# Patient Record
Sex: FEMALE | Race: WHITE | NOT HISPANIC OR LATINO | Employment: FULL TIME | URBAN - METROPOLITAN AREA
[De-identification: names, ages, dates, MRNs, and addresses within clinical notes are randomized per-mention and may not be internally consistent; named-entity substitution may affect disease eponyms.]

---

## 2023-02-20 LAB
BASOPHILS (10*3/UL) IN BLOOD BY AUTOMATED COUNT: NORMAL
BASOPHILS/100 LEUKOCYTES IN BLOOD BY AUTOMATED COUNT: NORMAL
EOSINOPHILS (10*3/UL) IN BLOOD BY AUTOMATED COUNT: NORMAL
EOSINOPHILS/100 LEUKOCYTES IN BLOOD BY AUTOMATED COUNT: NORMAL
ERYTHROCYTE DISTRIBUTION WIDTH (RATIO) BY AUTOMATED COUNT: NORMAL
ERYTHROCYTE MEAN CORPUSCULAR HEMOGLOBIN CONCENTRATION (G/DL) BY AUTOMATED: NORMAL
ERYTHROCYTE MEAN CORPUSCULAR VOLUME (FL) BY AUTOMATED COUNT: NORMAL
ERYTHROCYTES (10*6/UL) IN BLOOD BY AUTOMATED COUNT: NORMAL
FERRITIN (UG/LL) IN SER/PLAS: NORMAL
HEMATOCRIT (%) IN BLOOD BY AUTOMATED COUNT: NORMAL
HEMOGLOBIN (G/DL) IN BLOOD: NORMAL
IMMATURE GRANULOCYTES/100 LEUKOCYTES IN BLOOD BY AUTOMATED COUNT: NORMAL
IRON (UG/DL) IN SER/PLAS: NORMAL
IRON BINDING CAPACITY (UG/DL) IN SER/PLAS: NORMAL
IRON SATURATION (%) IN SER/PLAS: NORMAL
LEUKOCYTES (10*3/UL) IN BLOOD BY AUTOMATED COUNT: NORMAL
LYMPHOCYTES (10*3/UL) IN BLOOD BY AUTOMATED COUNT: NORMAL
LYMPHOCYTES/100 LEUKOCYTES IN BLOOD BY AUTOMATED COUNT: NORMAL
MANUAL DIFFERENTIAL Y/N: NORMAL
MONOCYTES (10*3/UL) IN BLOOD BY AUTOMATED COUNT: NORMAL
MONOCYTES/100 LEUKOCYTES IN BLOOD BY AUTOMATED COUNT: NORMAL
NEUTROPHILS (10*3/UL) IN BLOOD BY AUTOMATED COUNT: NORMAL
NEUTROPHILS/100 LEUKOCYTES IN BLOOD BY AUTOMATED COUNT: NORMAL
NRBC (PER 100 WBCS) BY AUTOMATED COUNT: NORMAL
PLATELETS (10*3/UL) IN BLOOD AUTOMATED COUNT: NORMAL

## 2023-04-05 DIAGNOSIS — I10 HYPERTENSION, UNSPECIFIED TYPE: Primary | ICD-10-CM

## 2023-04-05 RX ORDER — HYDROCHLOROTHIAZIDE 12.5 MG/1
12.5 TABLET ORAL DAILY
Qty: 90 TABLET | Refills: 3 | Status: SHIPPED | OUTPATIENT
Start: 2023-04-05 | End: 2023-08-15 | Stop reason: ALTCHOICE

## 2023-04-05 RX ORDER — HYDROCHLOROTHIAZIDE 12.5 MG/1
12.5 TABLET ORAL DAILY
COMMUNITY
Start: 2022-09-15 | End: 2023-04-05 | Stop reason: SDUPTHER

## 2023-04-05 NOTE — TELEPHONE ENCOUNTER
Lebron from [facility] called they need a refill on her Hydrochlorothiazide 12.5 mg takes it 1 time a day please send to [facility] in [facility]

## 2023-05-10 ENCOUNTER — APPOINTMENT (OUTPATIENT)
Dept: PRIMARY CARE | Facility: CLINIC | Age: 28
End: 2023-05-10
Payer: COMMERCIAL

## 2023-05-10 DIAGNOSIS — N30.00 ACUTE CYSTITIS WITHOUT HEMATURIA: Primary | ICD-10-CM

## 2023-05-19 ENCOUNTER — OFFICE VISIT (OUTPATIENT)
Dept: PRIMARY CARE | Facility: CLINIC | Age: 28
End: 2023-05-19
Payer: COMMERCIAL

## 2023-05-19 ENCOUNTER — TELEPHONE (OUTPATIENT)
Dept: PRIMARY CARE | Facility: CLINIC | Age: 28
End: 2023-05-19

## 2023-05-19 ENCOUNTER — LAB (OUTPATIENT)
Dept: LAB | Facility: LAB | Age: 28
End: 2023-05-19
Payer: COMMERCIAL

## 2023-05-19 VITALS
HEIGHT: 70 IN | HEART RATE: 62 BPM | DIASTOLIC BLOOD PRESSURE: 90 MMHG | RESPIRATION RATE: 18 BRPM | WEIGHT: 209.8 LBS | BODY MASS INDEX: 30.03 KG/M2 | SYSTOLIC BLOOD PRESSURE: 130 MMHG | OXYGEN SATURATION: 92 %

## 2023-05-19 DIAGNOSIS — J18.9 COMMUNITY ACQUIRED PNEUMONIA, UNSPECIFIED LATERALITY: ICD-10-CM

## 2023-05-19 DIAGNOSIS — N30.00 ACUTE CYSTITIS WITHOUT HEMATURIA: Primary | ICD-10-CM

## 2023-05-19 DIAGNOSIS — M79.89 LOCALIZED SWELLING OF BOTH LOWER EXTREMITIES: ICD-10-CM

## 2023-05-19 DIAGNOSIS — N30.00 ACUTE CYSTITIS WITHOUT HEMATURIA: ICD-10-CM

## 2023-05-19 PROBLEM — I10 HYPERTENSION: Status: ACTIVE | Noted: 2021-12-15

## 2023-05-19 PROBLEM — T78.40XA ALLERGIES: Status: ACTIVE | Noted: 2023-05-19

## 2023-05-19 PROBLEM — E55.9 VITAMIN D DEFICIENCY: Status: ACTIVE | Noted: 2023-05-19

## 2023-05-19 PROBLEM — N39.0 UTI (URINARY TRACT INFECTION): Status: ACTIVE | Noted: 2023-05-19

## 2023-05-19 PROBLEM — F17.200 NICOTINE USE DISORDER: Status: ACTIVE | Noted: 2021-05-31

## 2023-05-19 PROBLEM — E53.8 VITAMIN B12 DEFICIENCY: Status: ACTIVE | Noted: 2023-05-19

## 2023-05-19 PROBLEM — E78.5 HYPERLIPIDEMIA: Status: ACTIVE | Noted: 2023-05-19

## 2023-05-19 PROBLEM — R50.9 FEVER: Status: ACTIVE | Noted: 2019-07-30

## 2023-05-19 PROBLEM — F41.9 ANXIETY: Status: ACTIVE | Noted: 2023-05-19

## 2023-05-19 PROBLEM — J45.20 MILD INTERMITTENT ASTHMA WITHOUT COMPLICATION (HHS-HCC): Status: ACTIVE | Noted: 2019-07-30

## 2023-05-19 PROBLEM — F32.A DEPRESSION: Status: ACTIVE | Noted: 2023-05-19

## 2023-05-19 LAB
APPEARANCE, URINE: ABNORMAL
BILIRUBIN, URINE: NEGATIVE
BLOOD, URINE: NEGATIVE
COLOR, URINE: YELLOW
GLUCOSE, URINE: NEGATIVE MG/DL
KETONES, URINE: ABNORMAL MG/DL
LEUKOCYTE ESTERASE, URINE: ABNORMAL
MUCUS, URINE: ABNORMAL /LPF
NITRITE, URINE: NEGATIVE
PH, URINE: 6 (ref 5–8)
PROTEIN, URINE: NEGATIVE MG/DL
RBC, URINE: 1 /HPF (ref 0–5)
SPECIFIC GRAVITY, URINE: 1.01 (ref 1–1.03)
SQUAMOUS EPITHELIAL CELLS, URINE: 1 /HPF
UROBILINOGEN, URINE: 2 MG/DL (ref 0–1.9)
WBC CLUMPS, URINE: ABNORMAL /HPF
WBC, URINE: 49 /HPF (ref 0–5)

## 2023-05-19 PROCEDURE — 81001 URINALYSIS AUTO W/SCOPE: CPT

## 2023-05-19 PROCEDURE — 87086 URINE CULTURE/COLONY COUNT: CPT

## 2023-05-19 PROCEDURE — 99214 OFFICE O/P EST MOD 30 MIN: CPT

## 2023-05-19 PROCEDURE — 3075F SYST BP GE 130 - 139MM HG: CPT

## 2023-05-19 PROCEDURE — 87186 SC STD MICRODIL/AGAR DIL: CPT

## 2023-05-19 PROCEDURE — 3080F DIAST BP >= 90 MM HG: CPT

## 2023-05-19 PROCEDURE — 87077 CULTURE AEROBIC IDENTIFY: CPT

## 2023-05-19 RX ORDER — BUPRENORPHINE HYDROCHLORIDE 8 MG/1
8 TABLET SUBLINGUAL DAILY
COMMUNITY
End: 2023-08-15 | Stop reason: ALTCHOICE

## 2023-05-19 RX ORDER — TORSEMIDE 10 MG/1
10 TABLET ORAL DAILY PRN
Qty: 30 TABLET | Refills: 0 | Status: SHIPPED | OUTPATIENT
Start: 2023-05-19 | End: 2023-06-19

## 2023-05-19 RX ORDER — DOXYCYCLINE 100 MG/1
100 CAPSULE ORAL 2 TIMES DAILY
Qty: 20 CAPSULE | Refills: 0 | Status: SHIPPED | OUTPATIENT
Start: 2023-05-19 | End: 2023-05-19 | Stop reason: SDUPTHER

## 2023-05-19 RX ORDER — FUROSEMIDE 20 MG/1
20 TABLET ORAL DAILY PRN
COMMUNITY
Start: 2023-05-16 | End: 2023-05-24 | Stop reason: ALTCHOICE

## 2023-05-19 RX ORDER — ALBUTEROL SULFATE 90 UG/1
AEROSOL, METERED RESPIRATORY (INHALATION) EVERY 4 HOURS PRN
COMMUNITY
Start: 2021-06-27 | End: 2023-05-24 | Stop reason: SDUPTHER

## 2023-05-19 RX ORDER — DOXYCYCLINE 100 MG/1
100 CAPSULE ORAL 2 TIMES DAILY
Qty: 20 CAPSULE | Refills: 0 | Status: SHIPPED | OUTPATIENT
Start: 2023-05-19 | End: 2023-05-24 | Stop reason: SINTOL

## 2023-05-19 RX ORDER — IBUPROFEN 200 MG
1 TABLET ORAL EVERY 24 HOURS
COMMUNITY
Start: 2023-04-13 | End: 2024-02-20 | Stop reason: ALTCHOICE

## 2023-05-19 RX ORDER — SERTRALINE HYDROCHLORIDE 50 MG/1
50 TABLET, FILM COATED ORAL DAILY
COMMUNITY
Start: 2023-05-16 | End: 2023-08-15 | Stop reason: ALTCHOICE

## 2023-05-19 RX ORDER — TORSEMIDE 10 MG/1
10 TABLET ORAL DAILY PRN
Qty: 30 TABLET | Refills: 0 | Status: SHIPPED | OUTPATIENT
Start: 2023-05-19 | End: 2023-05-19 | Stop reason: SDUPTHER

## 2023-05-19 RX ORDER — AMLODIPINE BESYLATE 10 MG/1
10 TABLET ORAL DAILY
COMMUNITY
Start: 2023-05-16 | End: 2023-08-15 | Stop reason: ALTCHOICE

## 2023-05-19 RX ORDER — CLOBETASOL PROPIONATE 0.5 MG/G
1 CREAM TOPICAL 2 TIMES DAILY PRN
COMMUNITY
Start: 2023-05-02 | End: 2023-05-24 | Stop reason: ALTCHOICE

## 2023-05-19 ASSESSMENT — ENCOUNTER SYMPTOMS
ALLERGIC/IMMUNOLOGIC NEGATIVE: 1
GASTROINTESTINAL NEGATIVE: 1
PSYCHIATRIC NEGATIVE: 1
ENDOCRINE NEGATIVE: 1
CONSTITUTIONAL NEGATIVE: 1
RESPIRATORY NEGATIVE: 1
HEMATOLOGIC/LYMPHATIC NEGATIVE: 1
MUSCULOSKELETAL NEGATIVE: 1
EYES NEGATIVE: 1
NEUROLOGICAL NEGATIVE: 1
CARDIOVASCULAR NEGATIVE: 1

## 2023-05-19 ASSESSMENT — PATIENT HEALTH QUESTIONNAIRE - PHQ9
SUM OF ALL RESPONSES TO PHQ9 QUESTIONS 1 AND 2: 0
2. FEELING DOWN, DEPRESSED OR HOPELESS: NOT AT ALL
1. LITTLE INTEREST OR PLEASURE IN DOING THINGS: NOT AT ALL

## 2023-05-19 NOTE — PATIENT INSTRUCTIONS
Complete UA and culture. Will call with results.    Demadex sent for swollen hands and feet.     Follow up at scheduled future appointment.

## 2023-05-19 NOTE — TELEPHONE ENCOUNTER
She called and she would like her meds to go [facility] in [place] the Torsemide 10 mg & Doxycycline 100 mg

## 2023-05-19 NOTE — PROGRESS NOTES
Left leg swelling/ right hand swelling. She is currently taking furosemide and still having swelling.     Poss uti

## 2023-05-19 NOTE — PROGRESS NOTES
"Subjective   Patient ID: Emili Browne is a 28 y.o. female who presents for Follow-up.    HPI a 28-year-old female with past medical history of asthma, hypertension, anxiety and depression, current cigarette smoker arrives to the clinic with chief complaint of concern for UTI, cough and congestion with yellowish/greenish drainage, and swelling of the arms and legs.  She was given Lasix 20 mg oral tablet as needed for swollen hands and legs.  She reports that every time she has a UTI, she would have swollen hands and feet.  She states that she is having urinary tract infection-like symptoms today.  She reports urinary frequency.  She has been using the Lasix with minimal to no relief.  She also reports having yellowish-green productive cough/drainage.  She continues to smoke.  She is concerned for pneumonia.  She is here for further evaluation helping this.    Review of Systems   Constitutional: Negative.    HENT: Negative.     Eyes: Negative.    Respiratory: Negative.     Cardiovascular: Negative.    Gastrointestinal: Negative.    Endocrine: Negative.    Genitourinary: Negative.    Musculoskeletal: Negative.    Skin: Negative.    Allergic/Immunologic: Negative.    Neurological: Negative.    Hematological: Negative.    Psychiatric/Behavioral: Negative.     All other systems reviewed and are negative.      Objective   /90 (BP Location: Right arm, BP Cuff Size: Large adult)   Pulse 62   Resp 18   Ht 1.778 m (5' 10\")   Wt 95.2 kg (209 lb 12.8 oz)   SpO2 92%   BMI 30.10 kg/m²     Physical Exam  Vitals and nursing note reviewed.   Constitutional:       Appearance: Normal appearance.   HENT:      Head: Normocephalic and atraumatic.      Right Ear: Tympanic membrane normal.      Left Ear: Tympanic membrane normal.      Nose: Nose normal.      Mouth/Throat:      Mouth: Mucous membranes are moist.      Pharynx: Oropharynx is clear.   Eyes:      Extraocular Movements: Extraocular movements intact.      " Conjunctiva/sclera: Conjunctivae normal.      Pupils: Pupils are equal, round, and reactive to light.   Cardiovascular:      Rate and Rhythm: Normal rate and regular rhythm.   Pulmonary:      Effort: Pulmonary effort is normal.      Breath sounds: Normal breath sounds.   Abdominal:      General: Bowel sounds are normal.      Palpations: Abdomen is soft.   Musculoskeletal:         General: Normal range of motion.      Cervical back: Normal range of motion and neck supple.   Skin:     General: Skin is warm.      Capillary Refill: Capillary refill takes less than 2 seconds.   Neurological:      General: No focal deficit present.      Mental Status: She is alert and oriented to person, place, and time. Mental status is at baseline.   Psychiatric:         Mood and Affect: Mood normal.         Behavior: Behavior normal.         Thought Content: Thought content normal.         Judgment: Judgment normal.         Assessment/Plan   Problem List Items Addressed This Visit          Genitourinary    UTI (urinary tract infection) - Primary    Relevant Orders    Urine Culture    Urinalysis with Reflex Microscopic     Other Visit Diagnoses       Localized swelling of both lower extremities        Relevant Medications    torsemide (Demadex) 10 mg tablet          It was a pleasure seeing you in the office today.    Please complete the urinalysis and culture that was ordered today downstairs in lab.  I will call the recommended antibiotics once they are resulted.  I do believe that your swelling of the arms and legs is due to your UTI.  Although this is a weird anomaly, this usually happens for you.  I have sent over the Demadex 10 mg oral tablet as needed for 7 days.  Please use this medication as directed in the office.    I have sent you over doxycycline 100 mg oral tablet 2 times daily for 10 days for prophylactic CAP treatment.    Follow-up at your scheduled PCP appointment.    This document was generated using the assistance of  voice recognition software. If there are any errors of spelling, grammar, syntax, or meaning; please feel free to contact me directly for clarification.

## 2023-05-22 LAB — URINE CULTURE: ABNORMAL

## 2023-05-23 ENCOUNTER — TELEPHONE (OUTPATIENT)
Dept: PRIMARY CARE | Facility: CLINIC | Age: 28
End: 2023-05-23
Payer: COMMERCIAL

## 2023-05-23 DIAGNOSIS — N30.00 ACUTE CYSTITIS WITHOUT HEMATURIA: Primary | ICD-10-CM

## 2023-05-23 RX ORDER — NITROFURANTOIN 25; 75 MG/1; MG/1
100 CAPSULE ORAL 2 TIMES DAILY
Qty: 14 CAPSULE | Refills: 0 | Status: SHIPPED | OUTPATIENT
Start: 2023-05-23 | End: 2023-05-30

## 2023-05-23 NOTE — TELEPHONE ENCOUNTER
Patient states she just found out yesterday that she is pregnant. You have prescribed doxycycline for upper resp infection- should she still take that ? Should she take the Macrobid ? And she mentions she had to change bp meds last time she was pregnant- will she need to do this ? I did advise her to reach out to her gyn. She will do so today but still would like you to answer these questions

## 2023-05-23 NOTE — TELEPHONE ENCOUNTER
Please notify the patient that her urinalysis came back positive for UTI. Macrobid was sent for a week. This was sent to [facility]

## 2023-05-24 ENCOUNTER — OFFICE VISIT (OUTPATIENT)
Dept: PRIMARY CARE | Facility: CLINIC | Age: 28
End: 2023-05-24
Payer: COMMERCIAL

## 2023-05-24 ENCOUNTER — APPOINTMENT (OUTPATIENT)
Dept: PRIMARY CARE | Facility: CLINIC | Age: 28
End: 2023-05-24
Payer: COMMERCIAL

## 2023-05-24 VITALS
DIASTOLIC BLOOD PRESSURE: 80 MMHG | HEART RATE: 80 BPM | RESPIRATION RATE: 18 BRPM | OXYGEN SATURATION: 94 % | SYSTOLIC BLOOD PRESSURE: 132 MMHG | BODY MASS INDEX: 30.03 KG/M2 | WEIGHT: 209.8 LBS | HEIGHT: 70 IN

## 2023-05-24 DIAGNOSIS — N30.00 ACUTE CYSTITIS WITHOUT HEMATURIA: ICD-10-CM

## 2023-05-24 DIAGNOSIS — R11.2 NAUSEA AND VOMITING, UNSPECIFIED VOMITING TYPE: ICD-10-CM

## 2023-05-24 DIAGNOSIS — K21.9 GASTROESOPHAGEAL REFLUX DISEASE WITHOUT ESOPHAGITIS: ICD-10-CM

## 2023-05-24 DIAGNOSIS — J45.20 MILD INTERMITTENT ASTHMA WITHOUT COMPLICATION (HHS-HCC): Primary | ICD-10-CM

## 2023-05-24 DIAGNOSIS — Z3A.01 LESS THAN 8 WEEKS GESTATION OF PREGNANCY (HHS-HCC): ICD-10-CM

## 2023-05-24 DIAGNOSIS — I10 PRIMARY HYPERTENSION: ICD-10-CM

## 2023-05-24 PROBLEM — J18.9 COMMUNITY ACQUIRED PNEUMONIA: Status: RESOLVED | Noted: 2023-05-19 | Resolved: 2023-05-24

## 2023-05-24 PROBLEM — M79.89 LOCALIZED SWELLING OF BOTH LOWER EXTREMITIES: Status: RESOLVED | Noted: 2023-05-19 | Resolved: 2023-05-24

## 2023-05-24 LAB — PREGNANCY TEST URINE, POC: POSITIVE

## 2023-05-24 PROCEDURE — 3075F SYST BP GE 130 - 139MM HG: CPT

## 2023-05-24 PROCEDURE — 81025 URINE PREGNANCY TEST: CPT

## 2023-05-24 PROCEDURE — 99213 OFFICE O/P EST LOW 20 MIN: CPT

## 2023-05-24 PROCEDURE — 3079F DIAST BP 80-89 MM HG: CPT

## 2023-05-24 RX ORDER — MECLIZINE HYDROCHLORIDE 25 MG/1
25 TABLET ORAL 3 TIMES DAILY PRN
Qty: 30 TABLET | Refills: 11 | Status: SHIPPED | OUTPATIENT
Start: 2023-05-24 | End: 2023-08-15 | Stop reason: ALTCHOICE

## 2023-05-24 RX ORDER — ONDANSETRON HYDROCHLORIDE 8 MG/1
8 TABLET, FILM COATED ORAL EVERY 12 HOURS PRN
Qty: 20 TABLET | Refills: 0 | Status: SHIPPED | OUTPATIENT
Start: 2023-05-24 | End: 2023-08-15 | Stop reason: ALTCHOICE

## 2023-05-24 RX ORDER — FAMOTIDINE 20 MG/1
20 TABLET, FILM COATED ORAL 2 TIMES DAILY
Qty: 60 TABLET | Refills: 5 | Status: SHIPPED | OUTPATIENT
Start: 2023-05-24 | End: 2023-08-15 | Stop reason: ALTCHOICE

## 2023-05-24 RX ORDER — ALBUTEROL SULFATE 90 UG/1
2 AEROSOL, METERED RESPIRATORY (INHALATION) EVERY 4 HOURS PRN
Qty: 18 G | Refills: 0 | Status: SHIPPED | OUTPATIENT
Start: 2023-05-24 | End: 2024-02-20 | Stop reason: SDUPTHER

## 2023-05-24 ASSESSMENT — ENCOUNTER SYMPTOMS
DEPRESSION: 0
CARDIOVASCULAR NEGATIVE: 1
OCCASIONAL FEELINGS OF UNSTEADINESS: 0
GASTROINTESTINAL NEGATIVE: 1
MUSCULOSKELETAL NEGATIVE: 1
EYES NEGATIVE: 1
RESPIRATORY NEGATIVE: 1
PSYCHIATRIC NEGATIVE: 1
CONSTITUTIONAL NEGATIVE: 1
HEMATOLOGIC/LYMPHATIC NEGATIVE: 1
ALLERGIC/IMMUNOLOGIC NEGATIVE: 1
LOSS OF SENSATION IN FEET: 0
NEUROLOGICAL NEGATIVE: 1
ENDOCRINE NEGATIVE: 1

## 2023-05-24 ASSESSMENT — PAIN SCALES - GENERAL: PAINLEVEL: 0-NO PAIN

## 2023-05-24 ASSESSMENT — PATIENT HEALTH QUESTIONNAIRE - PHQ9
2. FEELING DOWN, DEPRESSED OR HOPELESS: NOT AT ALL
1. LITTLE INTEREST OR PLEASURE IN DOING THINGS: NOT AT ALL
SUM OF ALL RESPONSES TO PHQ9 QUESTIONS 1 AND 2: 0

## 2023-05-24 ASSESSMENT — ANXIETY QUESTIONNAIRES
7. FEELING AFRAID AS IF SOMETHING AWFUL MIGHT HAPPEN: SEVERAL DAYS
1. FEELING NERVOUS, ANXIOUS, OR ON EDGE: MORE THAN HALF THE DAYS
4. TROUBLE RELAXING: NOT AT ALL
3. WORRYING TOO MUCH ABOUT DIFFERENT THINGS: NOT AT ALL
5. BEING SO RESTLESS THAT IT IS HARD TO SIT STILL: NOT AT ALL
6. BECOMING EASILY ANNOYED OR IRRITABLE: NOT AT ALL
2. NOT BEING ABLE TO STOP OR CONTROL WORRYING: NOT AT ALL
GAD7 TOTAL SCORE: 3

## 2023-05-24 NOTE — PROGRESS NOTES
"Subjective   Patient ID: Emili Browne is a 28 y.o. female who presents for medication review (pregnant).    HPI a 28-year-old female with past medical history of recurrent UTIs, current cigarette smoker, anxiety and depression, currently on Suboxone for drug withdrawal, hypertension arrives to clinic with chief complaint of pregnancy.  Patient reports that she found out that she was pregnant yesterday.  She was seen in this office by myself 2 days ago.  She was prescribed doxycycline for concerns for an upper respiratory infection and Macrobid for a UTI.  Once she found out that she was pregnant, she called the office.  I told her to make an appointment for today.  She is here to discuss what medications she is safe to take.  She does report increased nausea and vomiting ever since she found out she was pregnant.  She is here for further evaluation and health maintenance.    Review of Systems   Constitutional: Negative.    HENT: Negative.     Eyes: Negative.    Respiratory: Negative.     Cardiovascular: Negative.    Gastrointestinal: Negative.    Endocrine: Negative.    Genitourinary: Negative.    Musculoskeletal: Negative.    Skin: Negative.    Allergic/Immunologic: Negative.    Neurological: Negative.    Hematological: Negative.    Psychiatric/Behavioral: Negative.     All other systems reviewed and are negative.      Objective   /80 (BP Location: Left arm, Patient Position: Sitting, BP Cuff Size: Adult)   Pulse 80   Resp 18   Ht 1.778 m (5' 10\")   Wt 95.2 kg (209 lb 12.8 oz)   SpO2 94%   BMI 30.10 kg/m²     Physical Exam  Vitals and nursing note reviewed.   Constitutional:       Appearance: Normal appearance.   HENT:      Head: Normocephalic and atraumatic.      Right Ear: Tympanic membrane normal.      Left Ear: Tympanic membrane normal.      Nose: Nose normal.      Mouth/Throat:      Mouth: Mucous membranes are moist.      Pharynx: Oropharynx is clear.   Eyes:      Extraocular Movements: " Extraocular movements intact.      Conjunctiva/sclera: Conjunctivae normal.      Pupils: Pupils are equal, round, and reactive to light.   Cardiovascular:      Rate and Rhythm: Normal rate and regular rhythm.   Pulmonary:      Effort: Pulmonary effort is normal.      Breath sounds: Normal breath sounds.   Abdominal:      General: Bowel sounds are normal.      Palpations: Abdomen is soft.   Musculoskeletal:         General: Normal range of motion.      Cervical back: Normal range of motion and neck supple.   Skin:     General: Skin is warm.      Capillary Refill: Capillary refill takes less than 2 seconds.   Neurological:      General: No focal deficit present.      Mental Status: She is alert and oriented to person, place, and time. Mental status is at baseline.   Psychiatric:         Mood and Affect: Mood normal.         Behavior: Behavior normal.         Thought Content: Thought content normal.         Judgment: Judgment normal.         Assessment/Plan   Problem List Items Addressed This Visit          Respiratory    Mild intermittent asthma without complication - Primary    Relevant Medications    albuterol 90 mcg/actuation inhaler     Other Visit Diagnoses       Gastroesophageal reflux disease without esophagitis        Relevant Medications    famotidine (Pepcid) 20 mg tablet    Nausea and vomiting, unspecified vomiting type        Relevant Medications    meclizine (Antivert) 25 mg tablet    ondansetron (Zofran) 8 mg tablet    Less than 8 weeks gestation of pregnancy        Relevant Orders    POCT pregnancy, urine manually resulted          It was a pleasure seeing you in the office today.    Please do not take your doxycycline anymore.  This is contraindicated in pregnancies.    I did speak with pharmacy in regards to your UTI, being pregnant, and your current allergies.  You are able to continue to take the Macrobid and completed as a whole.    In regards to your nausea and vomiting, I have sent over Zofran 8  mg oral tablet every 2 hours as needed, meclizine 25 mg oral tablet 3 times a day as needed, and Pepcid 20 mg oral tablet twice a day for acid reflux.  Please follow-up with OB/GYN for further evaluation and treatment.    In regards to your hypertension, you are okay to take amlodipine 10 mg oral tablet daily and hydrochlorothiazide 12.5 mg oral tablet daily.    We have discontinued your clobetasol cream and doxycycline today.    Regards to your shortness of breath and asthma, I have sent over an albuterol inhaler.  Per resources that was searched in the office today, I am unable to prescribe any other inhalers until you are cleared with OB/GYN.    In regards to your cigarette smoking, please use nicotine patches until you follow-up with your OB/GYN.    In regards to your Suboxone regimen, please discuss this medication with your OB/GYN and your Suboxone provider as they may have alternate treatments for this.    We did a point-of-care urine pregnancy test and it was positive for pregnancy.    Follow-up with your OB/GYN as scheduled.  Follow-up with your PCP appointment as scheduled.    Please do not hesitate to call office for any future questions or concerns.  Have a good rest of the summer and take care.

## 2023-06-17 DIAGNOSIS — M79.89 LOCALIZED SWELLING OF BOTH LOWER EXTREMITIES: ICD-10-CM

## 2023-06-19 RX ORDER — TORSEMIDE 10 MG/1
10 TABLET ORAL DAILY PRN
Qty: 30 TABLET | Refills: 0 | Status: SHIPPED | OUTPATIENT
Start: 2023-06-19 | End: 2023-08-15

## 2023-07-26 ENCOUNTER — TELEPHONE (OUTPATIENT)
Dept: PRIMARY CARE | Facility: CLINIC | Age: 28
End: 2023-07-26
Payer: COMMERCIAL

## 2023-07-26 NOTE — TELEPHONE ENCOUNTER
She called to see if you would write a letter for her support dog cause she has anxiety & depression for where she lives. Please call her at  [phone] and she can pick it up

## 2023-08-15 ENCOUNTER — OFFICE VISIT (OUTPATIENT)
Dept: PRIMARY CARE | Facility: CLINIC | Age: 28
End: 2023-08-15
Payer: COMMERCIAL

## 2023-08-15 VITALS
WEIGHT: 204.6 LBS | HEART RATE: 72 BPM | HEIGHT: 70 IN | DIASTOLIC BLOOD PRESSURE: 74 MMHG | SYSTOLIC BLOOD PRESSURE: 118 MMHG | BODY MASS INDEX: 29.29 KG/M2 | RESPIRATION RATE: 16 BRPM | OXYGEN SATURATION: 97 %

## 2023-08-15 DIAGNOSIS — Z3A.16 16 WEEKS GESTATION OF PREGNANCY (HHS-HCC): Primary | ICD-10-CM

## 2023-08-15 DIAGNOSIS — I10 PRIMARY HYPERTENSION: ICD-10-CM

## 2023-08-15 PROCEDURE — 3074F SYST BP LT 130 MM HG: CPT

## 2023-08-15 PROCEDURE — 99213 OFFICE O/P EST LOW 20 MIN: CPT

## 2023-08-15 PROCEDURE — 3078F DIAST BP <80 MM HG: CPT

## 2023-08-15 RX ORDER — LABETALOL 200 MG/1
200 TABLET, FILM COATED ORAL 2 TIMES DAILY
COMMUNITY
Start: 2023-06-09 | End: 2024-02-20 | Stop reason: ALTCHOICE

## 2023-08-15 RX ORDER — METRONIDAZOLE 500 MG/1
500 TABLET ORAL
COMMUNITY
Start: 2023-06-09 | End: 2023-08-15 | Stop reason: ALTCHOICE

## 2023-08-15 RX ORDER — BUPRENORPHINE 2 MG/1
10 TABLET SUBLINGUAL DAILY
COMMUNITY

## 2023-08-15 RX ORDER — MICONAZOLE NITRATE 2 %
2 CREAM (GRAM) TOPICAL AS NEEDED
COMMUNITY
Start: 2023-06-08 | End: 2024-02-20 | Stop reason: ALTCHOICE

## 2023-08-15 RX ORDER — VITAMIN A ACETATE, .BETA.-CAROTENE, ASCORBIC ACID, CHOLECALCIFEROL, .ALPHA.-TOCOPHEROL ACETATE, DL-, THIAMINE MONONITRATE, RIBOFLAVIN, NIACINAMIDE, PYRIDOXINE HYDROCHLORIDE, FOLIC ACID, CYANOCOBALAMIN, CALCIUM CARBONATE, FERROUS FUMARATE, ZINC OXIDE, CUPRIC OXIDE 9.9; 120; 920; 200; 400; 2; 12; 27; 1; 20; 10; 3; 1.84; 3080; 25 MG/1; MG/1; [IU]/1; MG/1; [IU]/1; MG/1; UG/1; MG/1; MG/1; MG/1; MG/1; MG/1; MG/1; [IU]/1; MG/1
1 TABLET, FILM COATED ORAL DAILY
COMMUNITY
Start: 2023-06-08 | End: 2024-02-20 | Stop reason: ALTCHOICE

## 2023-08-15 RX ORDER — PYRIDOXINE HCL (VITAMIN B6) 25 MG
25 TABLET ORAL DAILY
COMMUNITY
Start: 2023-06-08 | End: 2024-02-20 | Stop reason: ALTCHOICE

## 2023-08-15 ASSESSMENT — ENCOUNTER SYMPTOMS
LOSS OF SENSATION IN FEET: 0
DEPRESSION: 0
HEMATOLOGIC/LYMPHATIC NEGATIVE: 1
GASTROINTESTINAL NEGATIVE: 1
CONSTITUTIONAL NEGATIVE: 1
RESPIRATORY NEGATIVE: 1
NEUROLOGICAL NEGATIVE: 1
CARDIOVASCULAR NEGATIVE: 1
MUSCULOSKELETAL NEGATIVE: 1
EYES NEGATIVE: 1
ALLERGIC/IMMUNOLOGIC NEGATIVE: 1
ENDOCRINE NEGATIVE: 1
PSYCHIATRIC NEGATIVE: 1
OCCASIONAL FEELINGS OF UNSTEADINESS: 0

## 2023-08-15 ASSESSMENT — ANXIETY QUESTIONNAIRES
5. BEING SO RESTLESS THAT IT IS HARD TO SIT STILL: NOT AT ALL
GAD7 TOTAL SCORE: 0
IF YOU CHECKED OFF ANY PROBLEMS ON THIS QUESTIONNAIRE, HOW DIFFICULT HAVE THESE PROBLEMS MADE IT FOR YOU TO DO YOUR WORK, TAKE CARE OF THINGS AT HOME, OR GET ALONG WITH OTHER PEOPLE: NOT DIFFICULT AT ALL
4. TROUBLE RELAXING: NOT AT ALL
6. BECOMING EASILY ANNOYED OR IRRITABLE: NOT AT ALL
2. NOT BEING ABLE TO STOP OR CONTROL WORRYING: NOT AT ALL
1. FEELING NERVOUS, ANXIOUS, OR ON EDGE: NOT AT ALL
7. FEELING AFRAID AS IF SOMETHING AWFUL MIGHT HAPPEN: NOT AT ALL
3. WORRYING TOO MUCH ABOUT DIFFERENT THINGS: NOT AT ALL

## 2023-08-15 NOTE — PROGRESS NOTES
"Subjective   Patient ID: Emili Browne is a 28 y.o. female who presents for Follow-up.    HPI a 28-year-old female who is currently 16 weeks pregnant G2 T1  L1, history of opiate abuse currently on Subutex 10 mg oral tablet daily, hypertension arrives to the clinic with chief complaint of 6-month follow-up.  Patient reports that she follows [facility].  She notes that she is having a daughter.  She reports no recent illnesses and has been relatively healthy.  When she did meet up with OB/GYN, they discontinued the majority of her hypertension medications and started her on labetalol 200 mg oral tablet twice a day.  I did recommend that she follows up with cardiology given her history of opioid abuse and hypertension.  She denies any chest pain or shortness of breath.  She is here just to give us updates.  She is otherwise healthy with no additional complaints.    Review of Systems   Constitutional: Negative.    HENT: Negative.     Eyes: Negative.    Respiratory: Negative.     Cardiovascular: Negative.    Gastrointestinal: Negative.    Endocrine: Negative.    Genitourinary: Negative.    Musculoskeletal: Negative.    Skin: Negative.    Allergic/Immunologic: Negative.    Neurological: Negative.    Hematological: Negative.    Psychiatric/Behavioral: Negative.     All other systems reviewed and are negative.      Objective   /74   Pulse 72   Resp 16   Ht 1.778 m (5' 10\")   Wt 92.8 kg (204 lb 9.6 oz)   LMP 2023 (Exact Date)   SpO2 97%   BMI 29.36 kg/m²     Physical Exam  Vitals and nursing note reviewed.   Constitutional:       Appearance: Normal appearance.   HENT:      Head: Normocephalic and atraumatic.      Right Ear: Tympanic membrane normal.      Left Ear: Tympanic membrane normal.      Nose: Nose normal.      Mouth/Throat:      Mouth: Mucous membranes are moist.      Pharynx: Oropharynx is clear.   Eyes:      Extraocular Movements: Extraocular movements intact.      " Conjunctiva/sclera: Conjunctivae normal.      Pupils: Pupils are equal, round, and reactive to light.   Cardiovascular:      Rate and Rhythm: Normal rate and regular rhythm.   Pulmonary:      Effort: Pulmonary effort is normal.      Breath sounds: Normal breath sounds.   Abdominal:      General: Bowel sounds are normal.      Palpations: Abdomen is soft.   Musculoskeletal:         General: Normal range of motion.      Cervical back: Normal range of motion and neck supple.   Skin:     General: Skin is warm.      Capillary Refill: Capillary refill takes less than 2 seconds.   Neurological:      General: No focal deficit present.      Mental Status: She is alert and oriented to person, place, and time. Mental status is at baseline.   Psychiatric:         Mood and Affect: Mood normal.         Behavior: Behavior normal.         Thought Content: Thought content normal.         Judgment: Judgment normal.         Assessment/Plan   Problem List Items Addressed This Visit       Hypertension    Relevant Orders    Follow Up In [facility] - PCP - Established    16 weeks gestation of pregnancy - Primary    Relevant Orders    Comprehensive Metabolic Panel    CBC    Follow Up In [facility] - PCP - Established     It was a pleasure seeing you in the office.    Continue labetalol 200 mg oral tablet twice a day for your blood pressure.  Your blood pressure is controlled.    Continue healthy eating, diet, and exercise.  Continue the treatment plan set forth by your OB/GYN.  I have ordered blood work for you to complete prior to your next appointment.  These labs require you to fast.  You report that your daughter is due January 2024.  Next appointment will be February of next year.  You will be following up with Toshia OVALLES.    Please do not hesitate to call the office for any future questions or concerns.    Once again, congratulations.    I also advised you to follow low fat diet and exercise for at least 30  minutes daily.    Anticipatory guidance, age appropriate vaccines, screening exams, health promotion and prevention discussed.    This document was generated using the assistance of voice recognition software. If there are any errors of spelling, grammar, syntax, or meaning; please feel free to contact me directly for clarification.

## 2023-08-15 NOTE — PATIENT INSTRUCTIONS
Please continue follow up with OBGYN. You are 4 months pregnant. Congratulations!!    6 month follow up with Toshia Fischer CNP.    Blood work to complete before your next appointment.

## 2024-02-20 ENCOUNTER — OFFICE VISIT (OUTPATIENT)
Dept: PRIMARY CARE | Facility: CLINIC | Age: 29
End: 2024-02-20
Payer: COMMERCIAL

## 2024-02-20 VITALS
HEART RATE: 64 BPM | BODY MASS INDEX: 27.06 KG/M2 | WEIGHT: 189 LBS | DIASTOLIC BLOOD PRESSURE: 60 MMHG | HEIGHT: 70 IN | SYSTOLIC BLOOD PRESSURE: 90 MMHG

## 2024-02-20 DIAGNOSIS — E78.2 MIXED HYPERLIPIDEMIA: ICD-10-CM

## 2024-02-20 DIAGNOSIS — I10 PRIMARY HYPERTENSION: ICD-10-CM

## 2024-02-20 DIAGNOSIS — Z76.89 ENCOUNTER TO ESTABLISH CARE: ICD-10-CM

## 2024-02-20 DIAGNOSIS — J45.20 MILD INTERMITTENT ASTHMA WITHOUT COMPLICATION (HHS-HCC): ICD-10-CM

## 2024-02-20 DIAGNOSIS — E55.9 VITAMIN D DEFICIENCY: Primary | ICD-10-CM

## 2024-02-20 DIAGNOSIS — R35.0 URINE FREQUENCY: ICD-10-CM

## 2024-02-20 DIAGNOSIS — F19.11 HISTORY OF DRUG ABUSE (MULTI): ICD-10-CM

## 2024-02-20 DIAGNOSIS — R73.03 PREDIABETES: ICD-10-CM

## 2024-02-20 DIAGNOSIS — E53.8 VITAMIN B12 DEFICIENCY: ICD-10-CM

## 2024-02-20 DIAGNOSIS — F17.200 NICOTINE USE DISORDER: ICD-10-CM

## 2024-02-20 PROBLEM — R50.9 FEVER: Status: RESOLVED | Noted: 2019-07-30 | Resolved: 2024-02-20

## 2024-02-20 PROBLEM — Z3A.16 16 WEEKS GESTATION OF PREGNANCY (HHS-HCC): Status: RESOLVED | Noted: 2023-08-15 | Resolved: 2024-02-20

## 2024-02-20 PROBLEM — Z3A.01 LESS THAN 8 WEEKS GESTATION OF PREGNANCY (HHS-HCC): Status: RESOLVED | Noted: 2023-05-24 | Resolved: 2024-02-20

## 2024-02-20 PROBLEM — N39.0 UTI (URINARY TRACT INFECTION): Status: RESOLVED | Noted: 2023-05-19 | Resolved: 2024-02-20

## 2024-02-20 LAB
POC APPEARANCE, URINE: CLEAR
POC BILIRUBIN, URINE: NEGATIVE
POC BLOOD, URINE: NEGATIVE
POC COLOR, URINE: ABNORMAL
POC GLUCOSE, URINE: NEGATIVE MG/DL
POC KETONES, URINE: NEGATIVE MG/DL
POC LEUKOCYTES, URINE: NEGATIVE
POC NITRITE,URINE: NEGATIVE
POC PH, URINE: 7 PH
POC PROTEIN, URINE: ABNORMAL MG/DL
POC SPECIFIC GRAVITY, URINE: 1.01
POC UROBILINOGEN, URINE: 0.2 EU/DL

## 2024-02-20 PROCEDURE — 87086 URINE CULTURE/COLONY COUNT: CPT

## 2024-02-20 PROCEDURE — 3078F DIAST BP <80 MM HG: CPT | Performed by: CLINICAL NURSE SPECIALIST

## 2024-02-20 PROCEDURE — 81002 URINALYSIS NONAUTO W/O SCOPE: CPT | Performed by: CLINICAL NURSE SPECIALIST

## 2024-02-20 PROCEDURE — 4004F PT TOBACCO SCREEN RCVD TLK: CPT | Performed by: CLINICAL NURSE SPECIALIST

## 2024-02-20 PROCEDURE — 3074F SYST BP LT 130 MM HG: CPT | Performed by: CLINICAL NURSE SPECIALIST

## 2024-02-20 PROCEDURE — 80307 DRUG TEST PRSMV CHEM ANLYZR: CPT

## 2024-02-20 PROCEDURE — 99214 OFFICE O/P EST MOD 30 MIN: CPT | Performed by: CLINICAL NURSE SPECIALIST

## 2024-02-20 PROCEDURE — 80354 DRUG SCREENING FENTANYL: CPT

## 2024-02-20 RX ORDER — ALBUTEROL SULFATE 90 UG/1
2 AEROSOL, METERED RESPIRATORY (INHALATION) EVERY 4 HOURS PRN
Qty: 18 G | Refills: 3 | Status: SHIPPED | OUTPATIENT
Start: 2024-02-20

## 2024-02-20 ASSESSMENT — ENCOUNTER SYMPTOMS
CONFUSION: 0
ARTHRALGIAS: 0
CHEST TIGHTNESS: 0
WHEEZING: 0
FEVER: 0
NAUSEA: 0
COUGH: 0
WOUND: 0
BACK PAIN: 0
DIZZINESS: 0
JOINT SWELLING: 0
CHILLS: 0
SLEEP DISTURBANCE: 0
TROUBLE SWALLOWING: 0
LOSS OF SENSATION IN FEET: 0
SEIZURES: 0
UNEXPECTED WEIGHT CHANGE: 0
ACTIVITY CHANGE: 0
CONSTIPATION: 0
HEMATURIA: 0
DEPRESSION: 0
OCCASIONAL FEELINGS OF UNSTEADINESS: 0
SORE THROAT: 0
NECK PAIN: 0
PHOTOPHOBIA: 0
ABDOMINAL PAIN: 0
PALPITATIONS: 0
DIARRHEA: 0
BLOOD IN STOOL: 0
HEADACHES: 0
DYSURIA: 0
POLYDIPSIA: 0
BRUISES/BLEEDS EASILY: 0
VOMITING: 0
FLANK PAIN: 0
EYE PAIN: 0
FATIGUE: 0
MYALGIAS: 0
APPETITE CHANGE: 0
SHORTNESS OF BREATH: 0

## 2024-02-20 ASSESSMENT — COLUMBIA-SUICIDE SEVERITY RATING SCALE - C-SSRS
2. HAVE YOU ACTUALLY HAD ANY THOUGHTS OF KILLING YOURSELF?: NO
6. HAVE YOU EVER DONE ANYTHING, STARTED TO DO ANYTHING, OR PREPARED TO DO ANYTHING TO END YOUR LIFE?: NO
1. IN THE PAST MONTH, HAVE YOU WISHED YOU WERE DEAD OR WISHED YOU COULD GO TO SLEEP AND NOT WAKE UP?: NO

## 2024-02-20 NOTE — PROGRESS NOTES
"Subjective   Patient ID: Emili Browne is a 28 y.o. female who presents for Follow-up (Transfer from /Follow up, also would like to discuss not enough urine out put on going years ).  HPI    New patient here today to establish care.  Transfer care from .     Patient is post partum 1 month. C Section. Not currently breastfeeding. Denies any issues with Delivery/Pregnancy.     Patient is currently on Subtex. History of Heroin abuse. Rehab and [facility]. Has been on since December 2022. Following meetings. IOP after care. In the process of getting oldest daughter back. Following with Counseling and Probation. Requesting drug screening.     Torsemide PRN, using for Peripheral edema.     Urination, worse after her Pregnancy. States that she was \"peeing a lot while Pregnant.\" States that she has to Urinate, trouble peeing now.     Review of Systems   Constitutional:  Negative for activity change, appetite change, chills, fatigue, fever and unexpected weight change.   HENT:  Negative for ear pain, hearing loss, nosebleeds, sore throat, tinnitus and trouble swallowing.    Eyes:  Negative for photophobia, pain and visual disturbance.   Respiratory:  Negative for cough, chest tightness, shortness of breath and wheezing.    Cardiovascular:  Negative for chest pain, palpitations and leg swelling.   Gastrointestinal:  Negative for abdominal pain, blood in stool, constipation, diarrhea, nausea and vomiting.   Endocrine: Negative for cold intolerance, heat intolerance, polydipsia and polyuria.   Genitourinary:  Negative for dysuria, flank pain and hematuria.   Musculoskeletal:  Negative for arthralgias, back pain, joint swelling, myalgias and neck pain.   Skin:  Negative for pallor, rash and wound.   Allergic/Immunologic: Negative for immunocompromised state.   Neurological:  Negative for dizziness, seizures and headaches.   Hematological:  Does not bruise/bleed easily.   Psychiatric/Behavioral:  Negative for confusion and " sleep disturbance.        Objective   Physical Exam  Vitals and nursing note reviewed.   Constitutional:       General: She is not in acute distress.     Appearance: Normal appearance.   HENT:      Head: Normocephalic.      Nose: Nose normal.   Eyes:      Conjunctiva/sclera: Conjunctivae normal.   Neck:      Vascular: No carotid bruit.   Cardiovascular:      Rate and Rhythm: Normal rate and regular rhythm.      Pulses: Normal pulses.      Heart sounds: Normal heart sounds.   Pulmonary:      Effort: Pulmonary effort is normal.      Breath sounds: Normal breath sounds.   Abdominal:      General: Bowel sounds are normal.      Palpations: Abdomen is soft.   Musculoskeletal:         General: Normal range of motion.      Cervical back: Normal range of motion.   Skin:     General: Skin is warm and dry.   Neurological:      Mental Status: She is alert and oriented to person, place, and time. Mental status is at baseline.   Psychiatric:         Mood and Affect: Mood normal.         Behavior: Behavior normal.         Assessment/Plan       Hypertension: Blood pressure controlled at OV today. Continue to monitor.   Asthma: Albuterol PRN. Respiratory status at baseline. Continue to monitor.   Vitamin D Deficiency: Reevaluate with next lab work.   Vitamin B12 Deficiency: Reevaluate with next lab work.   Hyperlipidemia: Updated lab work ordered. Will follow up pending results.   Prediabetes: A1C 5.7%. Lifestyle changes recommended: Diet consisting of low fat foods, lean meats, high fiber, fresh fruits and vegetables. 150 min/ weekly aerobic exercise. Will check with next lab work.   Abnormal Urine: Studies ordered. Will follow up pending results.   History of Drug Abuse: Screening ordered as requested.   Nicotine Use: Counseled on long term negative health impacts on health if tobacco use continued.  Reviewed options for cessation aid including patches, lozenges, Wellbutrin.       Tdap: November 2023.   Unable to update  immunizations due to Insurance.     Toshia Fischer, APRN-CNS 02/20/24 1:22 PM

## 2024-02-21 LAB
AMPHETAMINES UR QL SCN: ABNORMAL
BARBITURATES UR QL SCN: ABNORMAL
BENZODIAZ UR QL SCN: ABNORMAL
BZE UR QL SCN: ABNORMAL
CANNABINOIDS UR QL SCN: ABNORMAL
FENTANYL+NORFENTANYL UR QL SCN: ABNORMAL
OPIATES UR QL SCN: ABNORMAL
OXYCODONE+OXYMORPHONE UR QL SCN: ABNORMAL
PCP UR QL SCN: ABNORMAL

## 2024-02-22 ENCOUNTER — TELEPHONE (OUTPATIENT)
Dept: PRIMARY CARE | Facility: CLINIC | Age: 29
End: 2024-02-22
Payer: COMMERCIAL

## 2024-02-22 DIAGNOSIS — N39.0 URINARY TRACT INFECTION WITHOUT HEMATURIA, SITE UNSPECIFIED: Primary | ICD-10-CM

## 2024-02-22 LAB — BACTERIA UR CULT: ABNORMAL

## 2024-02-22 RX ORDER — SULFAMETHOXAZOLE AND TRIMETHOPRIM 800; 160 MG/1; MG/1
1 TABLET ORAL 2 TIMES DAILY
Qty: 6 TABLET | Refills: 0 | Status: SHIPPED | OUTPATIENT
Start: 2024-02-22 | End: 2024-02-25

## 2024-02-22 NOTE — TELEPHONE ENCOUNTER
----- Message from ARELI Deleon-CNS sent at 2/22/2024  1:00 PM EST -----  Urine showed Group B Strep, medication sent to the Pharmacy for treatment. Thank you!

## 2024-02-25 LAB
FENTANYL UR CFM-MCNC: <2.5 NG/ML
NORFENTANYL UR CFM-MCNC: <2.5 NG/ML

## 2024-03-08 ENCOUNTER — APPOINTMENT (OUTPATIENT)
Dept: RADIOLOGY | Facility: HOSPITAL | Age: 29
End: 2024-03-08
Payer: COMMERCIAL

## 2024-03-08 ENCOUNTER — HOSPITAL ENCOUNTER (EMERGENCY)
Facility: HOSPITAL | Age: 29
Discharge: HOME | End: 2024-03-09
Attending: EMERGENCY MEDICINE
Payer: COMMERCIAL

## 2024-03-08 VITALS
OXYGEN SATURATION: 96 % | SYSTOLIC BLOOD PRESSURE: 126 MMHG | WEIGHT: 190 LBS | DIASTOLIC BLOOD PRESSURE: 79 MMHG | RESPIRATION RATE: 16 BRPM | TEMPERATURE: 98.1 F | HEART RATE: 71 BPM | BODY MASS INDEX: 28.14 KG/M2 | HEIGHT: 69 IN

## 2024-03-08 DIAGNOSIS — S16.1XXA CERVICAL STRAIN, ACUTE, INITIAL ENCOUNTER: Primary | ICD-10-CM

## 2024-03-08 DIAGNOSIS — J45.21 EXACERBATION OF INTERMITTENT ASTHMA, UNSPECIFIED ASTHMA SEVERITY (HHS-HCC): ICD-10-CM

## 2024-03-08 LAB
AMPHETAMINES UR QL SCN: NORMAL
BARBITURATES UR QL SCN: NORMAL
BENZODIAZ UR QL SCN: NORMAL
BZE UR QL SCN: NORMAL
CANNABINOIDS UR QL SCN: NORMAL
FENTANYL+NORFENTANYL UR QL SCN: NORMAL
HCG UR QL IA.RAPID: NEGATIVE
METHADONE UR QL SCN: NORMAL
OPIATES UR QL SCN: NORMAL
OXYCODONE+OXYMORPHONE UR QL SCN: NORMAL
PCP UR QL SCN: NORMAL

## 2024-03-08 PROCEDURE — 2500000002 HC RX 250 W HCPCS SELF ADMINISTERED DRUGS (ALT 637 FOR MEDICARE OP, ALT 636 FOR OP/ED): Performed by: EMERGENCY MEDICINE

## 2024-03-08 PROCEDURE — 70450 CT HEAD/BRAIN W/O DYE: CPT | Performed by: RADIOLOGY

## 2024-03-08 PROCEDURE — 2500000004 HC RX 250 GENERAL PHARMACY W/ HCPCS (ALT 636 FOR OP/ED): Performed by: EMERGENCY MEDICINE

## 2024-03-08 PROCEDURE — 73130 X-RAY EXAM OF HAND: CPT | Mod: RT

## 2024-03-08 PROCEDURE — 99285 EMERGENCY DEPT VISIT HI MDM: CPT | Mod: 25

## 2024-03-08 PROCEDURE — 80307 DRUG TEST PRSMV CHEM ANLYZR: CPT | Performed by: EMERGENCY MEDICINE

## 2024-03-08 PROCEDURE — 70450 CT HEAD/BRAIN W/O DYE: CPT

## 2024-03-08 PROCEDURE — 81025 URINE PREGNANCY TEST: CPT | Performed by: EMERGENCY MEDICINE

## 2024-03-08 PROCEDURE — 2500000001 HC RX 250 WO HCPCS SELF ADMINISTERED DRUGS (ALT 637 FOR MEDICARE OP): Performed by: EMERGENCY MEDICINE

## 2024-03-08 PROCEDURE — 73130 X-RAY EXAM OF HAND: CPT | Mod: RIGHT SIDE | Performed by: RADIOLOGY

## 2024-03-08 PROCEDURE — 94640 AIRWAY INHALATION TREATMENT: CPT

## 2024-03-08 PROCEDURE — 72125 CT NECK SPINE W/O DYE: CPT

## 2024-03-08 PROCEDURE — 72125 CT NECK SPINE W/O DYE: CPT | Performed by: RADIOLOGY

## 2024-03-08 RX ORDER — IBUPROFEN 200 MG
600 TABLET ORAL ONCE
Status: COMPLETED | OUTPATIENT
Start: 2024-03-08 | End: 2024-03-08

## 2024-03-08 RX ORDER — IBUPROFEN 600 MG/1
TABLET ORAL
Status: DISCONTINUED
Start: 2024-03-08 | End: 2024-03-09 | Stop reason: HOSPADM

## 2024-03-08 RX ORDER — IPRATROPIUM BROMIDE AND ALBUTEROL SULFATE 2.5; .5 MG/3ML; MG/3ML
3 SOLUTION RESPIRATORY (INHALATION) ONCE
Status: COMPLETED | OUTPATIENT
Start: 2024-03-08 | End: 2024-03-08

## 2024-03-08 RX ORDER — PREDNISONE 10 MG/1
50 TABLET ORAL ONCE
Status: COMPLETED | OUTPATIENT
Start: 2024-03-08 | End: 2024-03-08

## 2024-03-08 RX ORDER — IPRATROPIUM BROMIDE AND ALBUTEROL SULFATE 2.5; .5 MG/3ML; MG/3ML
SOLUTION RESPIRATORY (INHALATION)
Status: DISCONTINUED
Start: 2024-03-08 | End: 2024-03-09 | Stop reason: HOSPADM

## 2024-03-08 RX ORDER — PREDNISONE 10 MG/1
TABLET ORAL
Status: DISCONTINUED
Start: 2024-03-08 | End: 2024-03-09 | Stop reason: HOSPADM

## 2024-03-08 RX ADMIN — IBUPROFEN 600 MG: 600 TABLET, FILM COATED ORAL at 23:18

## 2024-03-08 RX ADMIN — IPRATROPIUM BROMIDE AND ALBUTEROL SULFATE 3 ML: .5; 3 SOLUTION RESPIRATORY (INHALATION) at 23:17

## 2024-03-08 RX ADMIN — PREDNISONE 50 MG: 10 TABLET ORAL at 23:18

## 2024-03-08 ASSESSMENT — COLUMBIA-SUICIDE SEVERITY RATING SCALE - C-SSRS
2. HAVE YOU ACTUALLY HAD ANY THOUGHTS OF KILLING YOURSELF?: NO
1. IN THE PAST MONTH, HAVE YOU WISHED YOU WERE DEAD OR WISHED YOU COULD GO TO SLEEP AND NOT WAKE UP?: NO
6. HAVE YOU EVER DONE ANYTHING, STARTED TO DO ANYTHING, OR PREPARED TO DO ANYTHING TO END YOUR LIFE?: NO

## 2024-03-08 ASSESSMENT — PAIN - FUNCTIONAL ASSESSMENT: PAIN_FUNCTIONAL_ASSESSMENT: 0-10

## 2024-03-08 ASSESSMENT — PAIN SCALES - GENERAL: PAINLEVEL_OUTOF10: 5 - MODERATE PAIN

## 2024-03-09 RX ORDER — CYCLOBENZAPRINE HCL 10 MG
10 TABLET ORAL 2 TIMES DAILY PRN
Qty: 14 TABLET | Refills: 0 | Status: SHIPPED | OUTPATIENT
Start: 2024-03-09 | End: 2024-03-16

## 2024-03-09 RX ORDER — PREDNISONE 50 MG/1
50 TABLET ORAL DAILY
Qty: 5 TABLET | Refills: 0 | Status: SHIPPED | OUTPATIENT
Start: 2024-03-09 | End: 2024-03-14
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
## 2024-03-11 ENCOUNTER — TELEPHONE (OUTPATIENT)
Dept: PRIMARY CARE | Facility: CLINIC | Age: 29
End: 2024-03-11
Payer: COMMERCIAL
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
## 2024-08-20 ENCOUNTER — APPOINTMENT (OUTPATIENT)
Dept: PRIMARY CARE | Facility: CLINIC | Age: 29
End: 2024-08-20
Payer: COMMERCIAL

## 2024-08-20 VITALS
HEART RATE: 72 BPM | BODY MASS INDEX: 28.88 KG/M2 | HEIGHT: 69 IN | DIASTOLIC BLOOD PRESSURE: 82 MMHG | SYSTOLIC BLOOD PRESSURE: 130 MMHG | WEIGHT: 195 LBS

## 2024-08-20 DIAGNOSIS — R82.90 ABNORMAL URINE ODOR: ICD-10-CM

## 2024-08-20 DIAGNOSIS — R35.0 URINE FREQUENCY: ICD-10-CM

## 2024-08-20 DIAGNOSIS — J45.20 MILD INTERMITTENT ASTHMA WITHOUT COMPLICATION (HHS-HCC): ICD-10-CM

## 2024-08-20 DIAGNOSIS — E53.8 VITAMIN B12 DEFICIENCY: ICD-10-CM

## 2024-08-20 DIAGNOSIS — E78.2 MIXED HYPERLIPIDEMIA: ICD-10-CM

## 2024-08-20 DIAGNOSIS — R73.03 PREDIABETES: ICD-10-CM

## 2024-08-20 DIAGNOSIS — E55.9 VITAMIN D DEFICIENCY: ICD-10-CM

## 2024-08-20 DIAGNOSIS — B19.20 HEPATITIS C VIRUS INFECTION WITHOUT HEPATIC COMA, UNSPECIFIED CHRONICITY: Primary | ICD-10-CM

## 2024-08-20 DIAGNOSIS — I10 PRIMARY HYPERTENSION: ICD-10-CM

## 2024-08-20 LAB
POC APPEARANCE, URINE: ABNORMAL
POC BILIRUBIN, URINE: NEGATIVE
POC BLOOD, URINE: NEGATIVE
POC COLOR, URINE: YELLOW
POC GLUCOSE, URINE: NEGATIVE MG/DL
POC KETONES, URINE: NEGATIVE MG/DL
POC LEUKOCYTES, URINE: ABNORMAL
POC NITRITE,URINE: POSITIVE
POC PH, URINE: 6.5 PH
POC PROTEIN, URINE: NEGATIVE MG/DL
POC SPECIFIC GRAVITY, URINE: 1.02
POC UROBILINOGEN, URINE: 1 EU/DL

## 2024-08-20 PROCEDURE — 3008F BODY MASS INDEX DOCD: CPT | Performed by: CLINICAL NURSE SPECIALIST

## 2024-08-20 PROCEDURE — 81002 URINALYSIS NONAUTO W/O SCOPE: CPT | Performed by: CLINICAL NURSE SPECIALIST

## 2024-08-20 PROCEDURE — 99214 OFFICE O/P EST MOD 30 MIN: CPT | Performed by: CLINICAL NURSE SPECIALIST

## 2024-08-20 PROCEDURE — 3079F DIAST BP 80-89 MM HG: CPT | Performed by: CLINICAL NURSE SPECIALIST

## 2024-08-20 PROCEDURE — 3075F SYST BP GE 130 - 139MM HG: CPT | Performed by: CLINICAL NURSE SPECIALIST

## 2024-08-20 PROCEDURE — 87186 SC STD MICRODIL/AGAR DIL: CPT

## 2024-08-20 PROCEDURE — 4004F PT TOBACCO SCREEN RCVD TLK: CPT | Performed by: CLINICAL NURSE SPECIALIST

## 2024-08-20 PROCEDURE — 99406 BEHAV CHNG SMOKING 3-10 MIN: CPT | Performed by: CLINICAL NURSE SPECIALIST

## 2024-08-20 PROCEDURE — 87086 URINE CULTURE/COLONY COUNT: CPT

## 2024-08-20 RX ORDER — CIPROFLOXACIN 500 MG/1
500 TABLET ORAL 2 TIMES DAILY
Qty: 14 TABLET | Refills: 0 | Status: SHIPPED | OUTPATIENT
Start: 2024-08-20 | End: 2024-08-27

## 2024-08-20 RX ORDER — ALBUTEROL SULFATE 90 UG/1
2 INHALANT RESPIRATORY (INHALATION) EVERY 4 HOURS PRN
Qty: 18 G | Refills: 11 | Status: SHIPPED | OUTPATIENT
Start: 2024-08-20

## 2024-08-20 ASSESSMENT — ENCOUNTER SYMPTOMS
DIARRHEA: 0
NAUSEA: 0
JOINT SWELLING: 0
CONSTIPATION: 0
SORE THROAT: 0
UNEXPECTED WEIGHT CHANGE: 0
MYALGIAS: 0
OCCASIONAL FEELINGS OF UNSTEADINESS: 0
APPETITE CHANGE: 0
COUGH: 1
TROUBLE SWALLOWING: 0
FATIGUE: 0
BACK PAIN: 0
CHILLS: 0
DYSURIA: 0
HEMATURIA: 0
WOUND: 0
SHORTNESS OF BREATH: 0
BRUISES/BLEEDS EASILY: 0
FEVER: 0
HEADACHES: 0
DIZZINESS: 0
LOSS OF SENSATION IN FEET: 0
VOMITING: 0
DEPRESSION: 0
SLEEP DISTURBANCE: 0
CONFUSION: 0
ARTHRALGIAS: 0
SEIZURES: 0
EYE PAIN: 0
PHOTOPHOBIA: 0
FLANK PAIN: 0
PALPITATIONS: 0
ACTIVITY CHANGE: 0
CHEST TIGHTNESS: 0
WHEEZING: 0
ABDOMINAL PAIN: 0
NECK PAIN: 0
BLOOD IN STOOL: 0
POLYDIPSIA: 0

## 2024-08-20 ASSESSMENT — PATIENT HEALTH QUESTIONNAIRE - PHQ9
1. LITTLE INTEREST OR PLEASURE IN DOING THINGS: NOT AT ALL
2. FEELING DOWN, DEPRESSED OR HOPELESS: NOT AT ALL
SUM OF ALL RESPONSES TO PHQ9 QUESTIONS 1 AND 2: 0

## 2024-08-20 ASSESSMENT — COLUMBIA-SUICIDE SEVERITY RATING SCALE - C-SSRS
6. HAVE YOU EVER DONE ANYTHING, STARTED TO DO ANYTHING, OR PREPARED TO DO ANYTHING TO END YOUR LIFE?: NO
2. HAVE YOU ACTUALLY HAD ANY THOUGHTS OF KILLING YOURSELF?: NO
1. IN THE PAST MONTH, HAVE YOU WISHED YOU WERE DEAD OR WISHED YOU COULD GO TO SLEEP AND NOT WAKE UP?: NO

## 2024-08-20 NOTE — PROGRESS NOTES
Subjective   Patient ID: Emili Browne is a 29 y.o. female who presents for Follow-up (Follow up/Discuss coughing up phlegm X's 2 months also urine smells strong 1 week).  HPI    Here today as a follow up appointment.     Has been coughing up phlegm for the past few months. Discolored phlegm. Using her Albuterol PRN.     Urine has been strong odor for the past week.     Patient is currently on Subtex. History of Heroin abuse. Rehab and Sober Living. Has been on since December 2022. Following meetings. IOP after care. Following with Counseling and Probation.      Torsemide PRN, using for Peripheral edema.     Review of Systems   Constitutional:  Negative for activity change, appetite change, chills, fatigue, fever and unexpected weight change.   HENT:  Negative for ear pain, hearing loss, nosebleeds, sore throat, tinnitus and trouble swallowing.    Eyes:  Negative for photophobia, pain and visual disturbance.   Respiratory:  Positive for cough. Negative for chest tightness, shortness of breath and wheezing.    Cardiovascular:  Negative for chest pain, palpitations and leg swelling.   Gastrointestinal:  Negative for abdominal pain, blood in stool, constipation, diarrhea, nausea and vomiting.   Endocrine: Negative for cold intolerance, heat intolerance, polydipsia and polyuria.   Genitourinary:  Negative for dysuria, flank pain and hematuria.   Musculoskeletal:  Negative for arthralgias, back pain, joint swelling, myalgias and neck pain.   Skin:  Negative for pallor, rash and wound.   Allergic/Immunologic: Negative for immunocompromised state.   Neurological:  Negative for dizziness, seizures and headaches.   Hematological:  Does not bruise/bleed easily.   Psychiatric/Behavioral:  Negative for confusion and sleep disturbance.        Objective   Physical Exam  Vitals and nursing note reviewed.   Constitutional:       General: She is not in acute distress.     Appearance: Normal appearance.   HENT:      Head:  Normocephalic.      Nose: Nose normal.   Eyes:      Conjunctiva/sclera: Conjunctivae normal.   Neck:      Vascular: No carotid bruit.   Cardiovascular:      Rate and Rhythm: Normal rate and regular rhythm.      Pulses: Normal pulses.      Heart sounds: Normal heart sounds.   Pulmonary:      Effort: Pulmonary effort is normal.      Breath sounds: Normal breath sounds.   Abdominal:      General: Bowel sounds are normal.      Palpations: Abdomen is soft.   Musculoskeletal:         General: Normal range of motion.      Cervical back: Normal range of motion.   Skin:     General: Skin is warm and dry.   Neurological:      Mental Status: She is alert and oriented to person, place, and time. Mental status is at baseline.   Psychiatric:         Mood and Affect: Mood normal.         Behavior: Behavior normal.       Assessment/Plan       Due for updated lab work. Has ordered.      Hypertension: Blood pressure controlled at OV today. Continue to monitor.   Asthma: Albuterol PRN. Continue to monitor.   Vitamin D Deficiency: Reevaluate with next lab work.   Vitamin B12 Deficiency: Reevaluate with next lab work.   Hyperlipidemia: Updated lab work ordered. Will follow up pending results.   Prediabetes: A1C 5.7%. Lifestyle changes recommended: Diet consisting of low fat foods, lean meats, high fiber, fresh fruits and vegetables. 150 min/ weekly aerobic exercise. Will check with next lab work.   Abnormal Urine: Urine dip done in the office. Will start Cipro. Will follow up pending results of Culture.    Nicotine Use: Counseled on long term negative health impacts on health if tobacco use continued.  Reviewed options for cessation aid including patches, lozenges, Wellbutrin.  3 minutes were spent counseling the patient on tobacco cessation.  Benefits of cessation were discussed as well as techniques to help quit.    Hepatitis C: GI referral.       Tdap: November 2023.   Unable to update immunizations due to Insurance.     Toshia ORONA  ARELI Fischer-CNS 08/20/24 1:45 PM

## 2024-08-23 ENCOUNTER — LAB (OUTPATIENT)
Dept: LAB | Facility: LAB | Age: 29
End: 2024-08-23
Payer: COMMERCIAL

## 2024-08-23 DIAGNOSIS — J45.20 MILD INTERMITTENT ASTHMA, UNCOMPLICATED (HHS-HCC): ICD-10-CM

## 2024-08-23 DIAGNOSIS — E78.2 MIXED HYPERLIPIDEMIA: ICD-10-CM

## 2024-08-23 DIAGNOSIS — E55.9 VITAMIN D DEFICIENCY: ICD-10-CM

## 2024-08-23 DIAGNOSIS — R73.03 PREDIABETES: ICD-10-CM

## 2024-08-23 DIAGNOSIS — E55.9 VITAMIN D DEFICIENCY, UNSPECIFIED: ICD-10-CM

## 2024-08-23 DIAGNOSIS — I10 PRIMARY HYPERTENSION: ICD-10-CM

## 2024-08-23 DIAGNOSIS — J45.20 MILD INTERMITTENT ASTHMA WITHOUT COMPLICATION (HHS-HCC): ICD-10-CM

## 2024-08-23 DIAGNOSIS — E53.8 VITAMIN B12 DEFICIENCY: ICD-10-CM

## 2024-08-23 DIAGNOSIS — I10 ESSENTIAL (PRIMARY) HYPERTENSION: Primary | ICD-10-CM

## 2024-08-23 DIAGNOSIS — E53.8 DEFICIENCY OF OTHER SPECIFIED B GROUP VITAMINS: ICD-10-CM

## 2024-08-23 LAB — BACTERIA UR CULT: ABNORMAL

## 2024-09-06 ENCOUNTER — LAB (OUTPATIENT)
Dept: LAB | Facility: LAB | Age: 29
End: 2024-09-06
Payer: COMMERCIAL

## 2024-09-06 DIAGNOSIS — R73.03 PREDIABETES: ICD-10-CM

## 2024-09-06 DIAGNOSIS — I10 ESSENTIAL (PRIMARY) HYPERTENSION: ICD-10-CM

## 2024-09-06 DIAGNOSIS — J45.20 MILD INTERMITTENT ASTHMA, UNCOMPLICATED (HHS-HCC): ICD-10-CM

## 2024-09-06 DIAGNOSIS — E78.2 MIXED HYPERLIPIDEMIA: ICD-10-CM

## 2024-09-06 DIAGNOSIS — E55.9 VITAMIN D DEFICIENCY, UNSPECIFIED: ICD-10-CM

## 2024-09-06 DIAGNOSIS — E53.8 DEFICIENCY OF OTHER SPECIFIED B GROUP VITAMINS: ICD-10-CM
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
## 2024-10-23 ENCOUNTER — OFFICE VISIT (OUTPATIENT)
Dept: GASTROENTEROLOGY | Facility: HOSPITAL | Age: 29
End: 2024-10-23
Payer: COMMERCIAL

## 2024-10-23 VITALS
OXYGEN SATURATION: 91 % | SYSTOLIC BLOOD PRESSURE: 135 MMHG | WEIGHT: 201 LBS | TEMPERATURE: 98.2 F | HEIGHT: 69 IN | HEART RATE: 64 BPM | DIASTOLIC BLOOD PRESSURE: 82 MMHG | BODY MASS INDEX: 29.77 KG/M2

## 2024-10-23 DIAGNOSIS — B19.20 HEPATITIS C VIRUS INFECTION WITHOUT HEPATIC COMA, UNSPECIFIED CHRONICITY: ICD-10-CM

## 2024-10-23 PROCEDURE — 4004F PT TOBACCO SCREEN RCVD TLK: CPT | Performed by: STUDENT IN AN ORGANIZED HEALTH CARE EDUCATION/TRAINING PROGRAM

## 2024-10-23 PROCEDURE — 3008F BODY MASS INDEX DOCD: CPT | Performed by: STUDENT IN AN ORGANIZED HEALTH CARE EDUCATION/TRAINING PROGRAM

## 2024-10-23 PROCEDURE — 99203 OFFICE O/P NEW LOW 30 MIN: CPT | Performed by: STUDENT IN AN ORGANIZED HEALTH CARE EDUCATION/TRAINING PROGRAM

## 2024-10-23 PROCEDURE — 99213 OFFICE O/P EST LOW 20 MIN: CPT | Mod: GC | Performed by: STUDENT IN AN ORGANIZED HEALTH CARE EDUCATION/TRAINING PROGRAM

## 2024-10-23 PROCEDURE — 3079F DIAST BP 80-89 MM HG: CPT | Performed by: STUDENT IN AN ORGANIZED HEALTH CARE EDUCATION/TRAINING PROGRAM

## 2024-10-23 PROCEDURE — 3075F SYST BP GE 130 - 139MM HG: CPT | Performed by: STUDENT IN AN ORGANIZED HEALTH CARE EDUCATION/TRAINING PROGRAM

## 2024-10-23 ASSESSMENT — PAIN SCALES - GENERAL: PAINLEVEL_OUTOF10: 0-NO PAIN

## 2024-10-23 NOTE — LETTER
October 23, 2024     Patient: Emili Browne   YOB: 1995   Date of Visit: 10/23/2024       To Whom It May Concern:    Emili Browne was seen in my clinic on 10/23/2024 at 2:00 pm. Please excuse Emili for her absence from work on this day to make the appointment.    If you have any questions or concerns, please don't hesitate to call.         Sincerely,         Chey Campa MD        CC: No Recipients

## 2024-10-23 NOTE — PROGRESS NOTES
I saw and evaluated the patient. I personally obtained the key and critical portions of the history and physical exam or was physically present for key and critical portions performed by the resident/fellow. I reviewed the resident/fellow's documentation and discussed the patient with the resident/fellow. I agree with the resident/fellow's medical decision making as documented in the note with the exception/addition of the following:  HCV antibody positive  HCV  PCR persistently  negative.   Will ask her to repeat HCV PCR .   No treatment necessary.   Would get fibroscan and if low degree of fibrosis (F2 or less) then no follow up with hepatology should be necessary.     Vlad Hahn MD

## 2024-10-23 NOTE — PATIENT INSTRUCTIONS
Thank you for seeing us in hepatology clinic today. Based on your labs, your viral level is negative which means you dont have hepatitis C. You will likely always the hepatitic C antibody. We will recheck the level with your next set of labs to be entirely sure. We will order you for a fibroscan of your liver to assess if your liver has hardening/scarring from hepatitis c. If you have some scarring of your liver we will keep seeing you in clinic every 6 months.

## 2024-11-15 ENCOUNTER — APPOINTMENT (OUTPATIENT)
Dept: GASTROENTEROLOGY | Facility: CLINIC | Age: 29
End: 2024-11-15
Payer: COMMERCIAL

## 2024-11-26 ENCOUNTER — CLINICAL SUPPORT (OUTPATIENT)
Dept: GASTROENTEROLOGY | Facility: CLINIC | Age: 29
End: 2024-11-26
Payer: COMMERCIAL

## 2024-11-26 ENCOUNTER — LAB (OUTPATIENT)
Dept: LAB | Facility: LAB | Age: 29
End: 2024-11-26
Payer: COMMERCIAL

## 2024-11-26 DIAGNOSIS — I10 ESSENTIAL (PRIMARY) HYPERTENSION: ICD-10-CM

## 2024-11-26 DIAGNOSIS — E78.2 MIXED HYPERLIPIDEMIA: ICD-10-CM

## 2024-11-26 DIAGNOSIS — B19.20 HEPATITIS C VIRUS INFECTION WITHOUT HEPATIC COMA, UNSPECIFIED CHRONICITY: ICD-10-CM

## 2024-11-26 DIAGNOSIS — R73.03 PREDIABETES: ICD-10-CM

## 2024-11-26 DIAGNOSIS — E55.9 VITAMIN D DEFICIENCY, UNSPECIFIED: ICD-10-CM

## 2024-11-26 DIAGNOSIS — J45.20 MILD INTERMITTENT ASTHMA, UNCOMPLICATED (HHS-HCC): ICD-10-CM

## 2024-11-26 DIAGNOSIS — E53.8 DEFICIENCY OF OTHER SPECIFIED B GROUP VITAMINS: ICD-10-CM

## 2024-11-26 LAB
25(OH)D3 SERPL-MCNC: 40 NG/ML (ref 30–100)
ALBUMIN SERPL BCP-MCNC: 4.6 G/DL (ref 3.4–5)
ALP SERPL-CCNC: 44 U/L (ref 33–110)
ALT SERPL W P-5'-P-CCNC: 22 U/L (ref 7–45)
ANION GAP SERPL CALC-SCNC: 13 MMOL/L (ref 10–20)
AST SERPL W P-5'-P-CCNC: 23 U/L (ref 9–39)
BASOPHILS # BLD AUTO: 0.04 X10*3/UL (ref 0–0.1)
BASOPHILS NFR BLD AUTO: 0.8 %
BILIRUB SERPL-MCNC: 0.4 MG/DL (ref 0–1.2)
BUN SERPL-MCNC: 12 MG/DL (ref 6–23)
CALCIUM SERPL-MCNC: 9.7 MG/DL (ref 8.6–10.6)
CHLORIDE SERPL-SCNC: 104 MMOL/L (ref 98–107)
CHOLEST SERPL-MCNC: 147 MG/DL (ref 0–199)
CHOLESTEROL/HDL RATIO: 3.3
CO2 SERPL-SCNC: 26 MMOL/L (ref 21–32)
CREAT SERPL-MCNC: 0.67 MG/DL (ref 0.5–1.05)
EGFRCR SERPLBLD CKD-EPI 2021: >90 ML/MIN/1.73M*2
EOSINOPHIL # BLD AUTO: 0.23 X10*3/UL (ref 0–0.7)
EOSINOPHIL NFR BLD AUTO: 4.6 %
ERYTHROCYTE [DISTWIDTH] IN BLOOD BY AUTOMATED COUNT: 12 % (ref 11.5–14.5)
EST. AVERAGE GLUCOSE BLD GHB EST-MCNC: 108 MG/DL
GLUCOSE SERPL-MCNC: 98 MG/DL (ref 74–99)
HAV IGM SER QL: NONREACTIVE
HBA1C MFR BLD: 5.4 %
HBV CORE IGM SER QL: NONREACTIVE
HBV SURFACE AG SERPL QL IA: NONREACTIVE
HCT VFR BLD AUTO: 41.5 % (ref 36–46)
HCV AB SER QL: REACTIVE
HDLC SERPL-MCNC: 45.1 MG/DL
HGB BLD-MCNC: 13.4 G/DL (ref 12–16)
HIV 1+2 AB+HIV1 P24 AG SERPL QL IA: NONREACTIVE
IMM GRANULOCYTES # BLD AUTO: 0.01 X10*3/UL (ref 0–0.7)
IMM GRANULOCYTES NFR BLD AUTO: 0.2 % (ref 0–0.9)
LDLC SERPL CALC-MCNC: 85 MG/DL
LYMPHOCYTES # BLD AUTO: 1.99 X10*3/UL (ref 1.2–4.8)
LYMPHOCYTES NFR BLD AUTO: 40 %
MCH RBC QN AUTO: 28.3 PG (ref 26–34)
MCHC RBC AUTO-ENTMCNC: 32.3 G/DL (ref 32–36)
MCV RBC AUTO: 88 FL (ref 80–100)
MONOCYTES # BLD AUTO: 0.37 X10*3/UL (ref 0.1–1)
MONOCYTES NFR BLD AUTO: 7.4 %
NEUTROPHILS # BLD AUTO: 2.33 X10*3/UL (ref 1.2–7.7)
NEUTROPHILS NFR BLD AUTO: 47 %
NON HDL CHOLESTEROL: 102 MG/DL (ref 0–149)
NRBC BLD-RTO: 0 /100 WBCS (ref 0–0)
PLATELET # BLD AUTO: 188 X10*3/UL (ref 150–450)
POTASSIUM SERPL-SCNC: 4.2 MMOL/L (ref 3.5–5.3)
PROT SERPL-MCNC: 7.8 G/DL (ref 6.4–8.2)
RBC # BLD AUTO: 4.74 X10*6/UL (ref 4–5.2)
SODIUM SERPL-SCNC: 139 MMOL/L (ref 136–145)
TREPONEMA PALLIDUM IGG+IGM AB [PRESENCE] IN SERUM OR PLASMA BY IMMUNOASSAY: NONREACTIVE
TRIGL SERPL-MCNC: 85 MG/DL (ref 0–149)
TSH SERPL-ACNC: 1.33 MIU/L (ref 0.44–3.98)
VIT B12 SERPL-MCNC: 381 PG/ML (ref 211–911)
VLDL: 17 MG/DL (ref 0–40)
WBC # BLD AUTO: 5 X10*3/UL (ref 4.4–11.3)

## 2024-11-26 PROCEDURE — 84443 ASSAY THYROID STIM HORMONE: CPT

## 2024-11-26 PROCEDURE — 82607 VITAMIN B-12: CPT

## 2024-11-26 PROCEDURE — 87389 HIV-1 AG W/HIV-1&-2 AB AG IA: CPT

## 2024-11-26 PROCEDURE — 85025 COMPLETE CBC W/AUTO DIFF WBC: CPT

## 2024-11-26 PROCEDURE — 36415 COLL VENOUS BLD VENIPUNCTURE: CPT

## 2024-11-26 PROCEDURE — 80053 COMPREHEN METABOLIC PANEL: CPT

## 2024-11-26 PROCEDURE — 86780 TREPONEMA PALLIDUM: CPT

## 2024-11-26 PROCEDURE — 80074 ACUTE HEPATITIS PANEL: CPT

## 2024-11-26 PROCEDURE — 80061 LIPID PANEL: CPT

## 2024-11-26 PROCEDURE — 83036 HEMOGLOBIN GLYCOSYLATED A1C: CPT

## 2024-11-26 PROCEDURE — 82306 VITAMIN D 25 HYDROXY: CPT

## 2024-11-26 PROCEDURE — 87521 HEPATITIS C PROBE&RVRS TRNSC: CPT

## 2024-11-27 LAB
HCV RNA SERPL NAA+PROBE-ACNC: NOT DETECTED K[IU]/ML
HCV RNA SERPL NAA+PROBE-LOG IU: NORMAL {LOG_IU}/ML
LABORATORY COMMENT REPORT: NORMAL

## 2025-02-20 ENCOUNTER — APPOINTMENT (OUTPATIENT)
Dept: PRIMARY CARE | Facility: CLINIC | Age: 30
End: 2025-02-20
Payer: COMMERCIAL

## 2025-02-20 VITALS
HEIGHT: 69 IN | BODY MASS INDEX: 30.96 KG/M2 | DIASTOLIC BLOOD PRESSURE: 80 MMHG | HEART RATE: 60 BPM | SYSTOLIC BLOOD PRESSURE: 110 MMHG | WEIGHT: 209 LBS

## 2025-02-20 DIAGNOSIS — J32.9 RECURRENT SINUSITIS: Primary | ICD-10-CM

## 2025-02-20 DIAGNOSIS — E55.9 VITAMIN D DEFICIENCY: ICD-10-CM

## 2025-02-20 DIAGNOSIS — E53.8 VITAMIN B12 DEFICIENCY: ICD-10-CM

## 2025-02-20 DIAGNOSIS — Z00.00 HEALTHCARE MAINTENANCE: ICD-10-CM

## 2025-02-20 DIAGNOSIS — E78.2 MIXED HYPERLIPIDEMIA: ICD-10-CM

## 2025-02-20 DIAGNOSIS — I10 PRIMARY HYPERTENSION: ICD-10-CM

## 2025-02-20 DIAGNOSIS — R73.03 PREDIABETES: ICD-10-CM

## 2025-02-20 DIAGNOSIS — B19.20 HEPATITIS C VIRUS INFECTION WITHOUT HEPATIC COMA, UNSPECIFIED CHRONICITY: ICD-10-CM

## 2025-02-20 DIAGNOSIS — J45.20 MILD INTERMITTENT ASTHMA WITHOUT COMPLICATION (HHS-HCC): ICD-10-CM

## 2025-02-20 PROCEDURE — 99406 BEHAV CHNG SMOKING 3-10 MIN: CPT | Performed by: CLINICAL NURSE SPECIALIST

## 2025-02-20 PROCEDURE — 99395 PREV VISIT EST AGE 18-39: CPT | Performed by: CLINICAL NURSE SPECIALIST

## 2025-02-20 PROCEDURE — 99213 OFFICE O/P EST LOW 20 MIN: CPT | Performed by: CLINICAL NURSE SPECIALIST

## 2025-02-20 PROCEDURE — 3074F SYST BP LT 130 MM HG: CPT | Performed by: CLINICAL NURSE SPECIALIST

## 2025-02-20 PROCEDURE — 3008F BODY MASS INDEX DOCD: CPT | Performed by: CLINICAL NURSE SPECIALIST

## 2025-02-20 PROCEDURE — 3079F DIAST BP 80-89 MM HG: CPT | Performed by: CLINICAL NURSE SPECIALIST

## 2025-02-20 ASSESSMENT — ENCOUNTER SYMPTOMS
ACTIVITY CHANGE: 0
EYE PAIN: 0
HEADACHES: 0
LOSS OF SENSATION IN FEET: 0
WHEEZING: 0
SLEEP DISTURBANCE: 0
APPETITE CHANGE: 0
ABDOMINAL PAIN: 0
NECK PAIN: 0
FEVER: 0
WOUND: 0
BRUISES/BLEEDS EASILY: 0
SHORTNESS OF BREATH: 0
ARTHRALGIAS: 0
OCCASIONAL FEELINGS OF UNSTEADINESS: 0
PALPITATIONS: 0
DEPRESSION: 0
PHOTOPHOBIA: 0
JOINT SWELLING: 0
COUGH: 0
UNEXPECTED WEIGHT CHANGE: 0
NAUSEA: 0
DIZZINESS: 0
DIARRHEA: 0
SORE THROAT: 0
POLYDIPSIA: 0
VOMITING: 0
BLOOD IN STOOL: 0
BACK PAIN: 0
CHILLS: 0
MYALGIAS: 0
FATIGUE: 0
CONFUSION: 0
HEMATURIA: 0
CHEST TIGHTNESS: 0
CONSTIPATION: 0
TROUBLE SWALLOWING: 0
SEIZURES: 0
FLANK PAIN: 0
DYSURIA: 0

## 2025-02-20 NOTE — LETTER
February 20, 2025     Patient: Emili Browne   YOB: 1995   Date of Visit: 2/20/2025       To Whom It May Concern:    Emili Browne was seen in my clinic on 2/20/2025 at 2:00 pm. Please excuse Emili for her absence from work on this day to make the appointment.    If you have any questions or concerns, please don't hesitate to call.         Sincerely,         Toshia Fischer, ARELI-CNS
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
## 2025-03-03 ENCOUNTER — APPOINTMENT (OUTPATIENT)
Dept: OTOLARYNGOLOGY | Facility: CLINIC | Age: 30
End: 2025-03-03
Payer: COMMERCIAL

## 2025-03-05 ENCOUNTER — APPOINTMENT (OUTPATIENT)
Dept: OTOLARYNGOLOGY | Facility: CLINIC | Age: 30
End: 2025-03-05
Payer: COMMERCIAL

## 2025-03-05 VITALS — WEIGHT: 209 LBS | HEIGHT: 69 IN | BODY MASS INDEX: 30.96 KG/M2

## 2025-03-05 DIAGNOSIS — R44.8 FACIAL PRESSURE: ICD-10-CM

## 2025-03-05 DIAGNOSIS — J32.9 CHRONIC RHINOSINUSITIS: Primary | ICD-10-CM

## 2025-03-05 DIAGNOSIS — J34.2 DEVIATED NASAL SEPTUM: ICD-10-CM

## 2025-03-05 DIAGNOSIS — J34.89 NASAL DRAINAGE: ICD-10-CM

## 2025-03-05 DIAGNOSIS — J34.3 HYPERTROPHY OF BOTH INFERIOR NASAL TURBINATES: ICD-10-CM

## 2025-03-05 DIAGNOSIS — J32.9 RECURRENT SINUSITIS: ICD-10-CM

## 2025-03-05 DIAGNOSIS — J34.89 NASAL OBSTRUCTION: ICD-10-CM

## 2025-03-05 DIAGNOSIS — R09.81 NASAL CONGESTION: ICD-10-CM

## 2025-03-05 PROCEDURE — 99204 OFFICE O/P NEW MOD 45 MIN: CPT | Performed by: STUDENT IN AN ORGANIZED HEALTH CARE EDUCATION/TRAINING PROGRAM

## 2025-03-05 PROCEDURE — 4004F PT TOBACCO SCREEN RCVD TLK: CPT | Performed by: STUDENT IN AN ORGANIZED HEALTH CARE EDUCATION/TRAINING PROGRAM

## 2025-03-05 PROCEDURE — 31231 NASAL ENDOSCOPY DX: CPT | Performed by: STUDENT IN AN ORGANIZED HEALTH CARE EDUCATION/TRAINING PROGRAM

## 2025-03-05 PROCEDURE — 3008F BODY MASS INDEX DOCD: CPT | Performed by: STUDENT IN AN ORGANIZED HEALTH CARE EDUCATION/TRAINING PROGRAM

## 2025-03-05 RX ORDER — SOD CHLOR,BICARB/SQUEEZ BOTTLE
PACKET, WITH RINSE DEVICE NASAL
Qty: 1 EACH | Refills: 3 | Status: SHIPPED | OUTPATIENT
Start: 2025-03-05

## 2025-03-05 RX ORDER — FLUTICASONE PROPIONATE 50 MCG
1 SPRAY, SUSPENSION (ML) NASAL 2 TIMES DAILY
Qty: 48 G | Refills: 3 | Status: SHIPPED | OUTPATIENT
Start: 2025-03-05 | End: 2026-03-05

## 2025-03-05 NOTE — PROGRESS NOTES
HPI  29-year-old female presenting for initial evaluation of multiple sinonasal complaints.  Main Symptoms: Bilateral nasal congestion/obstruction, anterior and posterior mucoid nasal drainage, occasional facial pressure along the maxillary regions bilaterally  Duration: Years  Laterality: Bilateral  Has ~2 episodes of sinusitis per year requiring antibiotic therapy  Denies known environmental allergies  Patient denies facial pain, facial numbness, ansomia, dental pain, watery/ itchy eyes, immunotherapy.    No odynophagia/dysphagia/SOB/dyspnea/hoarseness/fevers/chills/weight loss/night sweats.    Current treatment:  Nasal Steroid: No  Sinus Rinse: No  Antihistamine: No  Prednisone: No  Other: None    Recent CT: None    Previous nasal/sinus surgery: None     Past Medical History:   Diagnosis Date    Opioid dependence, uncomplicated (Multi)     Opioid dependence    Personal history of other diseases of the respiratory system     Personal history of asthma        Past Surgical History:   Procedure Laterality Date    OTHER SURGICAL HISTORY  07/12/2021    Incision & drainage    OTHER SURGICAL HISTORY  02/12/2021    Foot surgery        Current Outpatient Medications   Medication Instructions    albuterol 90 mcg/actuation inhaler 2 puffs, inhalation, Every 4 hours PRN    buprenorphine (SUBUTEX) 10 mg, Daily        Allergies   Allergen Reactions    Amoxicillin Hives    Bee Venom Protein (Honey Bee) Unknown    Cephalexin Hives, Itching, Rash and Swelling    Penicillins Hives, Itching, Rash, Swelling and Unknown    Vancomycin Hives, Itching, Rash, Swelling and Unknown     Patient became itchy and red at the neck and chest.     Patient became itchy and red at the neck and chest.        Review of Systems  A detailed 12 point ROS was performed and is negative except as noted in the intake form, HPI and/or Past Medical History    Physical Exam   CONSTITUTIONAL: Well developed, well nourished.  VOICE: Normal voice  quality  RESPIRATION: Breathing comfortably, no stridor.  CV: No clubbing/cyanosis/edema in hands.  EYES: EOM Intact, sclera normal.  NEURO: Alert and oriented times 3, Cranial nerves V,VII intact and symmetric bilaterally.  HEAD AND FACE: Symmetric facial features, no masses or lesions, sinuses nontender to palpation.  SALIVARY GLANDS: Parotid and submandibular glands normal bilaterally.  EARS: Normal external ears, external auditory canals, and TMs to otoscopy  + NOSE: External nose midline, anterior rhinoscopy is normal with limited visualization to the anterior aspect of the interior turbinates. No lesions noted.  Bilateral inferior turbinate hypertrophy  Left septal deviation  ORAL CAVITY/OROPHARYNX/LIPS: Normal mucous membranes, normal floor of mouth/tongue/OP, no masses or lesions are noted.  PHARYNGEAL WALLS AND NASOPHARYNX: No masses noted. Mucosa appears clean and moist  NECK/LYMPH: No LAD, no thyroid masses. Trachea palpably midline  SKIN: Neck skin is without injury  PSYCH: Alert and oriented with appropriate mood and affect     Nasal endoscopy:  PROCEDURE NOTE    For better visualization because of septal deviation, turbinate hypertrophy nasal endoscopy was performed after verbal consent was obtained by the patient and/or guardian. Both nostrils were sprayed with a mixture of lidocaine 4% and Afrin. After a sufficient amount of time elapsed for mucosal anesthesia to take place, the nasal endoscope was advanced into the nostril.    The following areas were visualized:  Nasal passage, nasal septum, turbinates, middle meatus, nasopharynx, sinus ostia    The patient tolerated the procedure well and these structures were found to be normal except as follows:  Bilateral inferior turbinate hypertrophy  Left septal deviation  Bilateral generalized mucosal edema  No nasopharyngeal lesions/masses  No mucopurulence, polyps, nor masses seen in inferior meati, middle meati, nor sphenoethmoidal recesses bilaterally.        Assessment  Chronic rhinosinusitis  Facial pressure  Nasal drainage  Nasal airway obstruction  Nasal septal deviation  Bilateral inferior turbinate hypertrophy      Plan  The patient and I discussed their current nasal / sinus symptoms as well as several therapeutic options. I would like to begin a nasal hygiene/ medical regimen consisting of Flonase, saline irrigations.  We will consider imaging at follow-up if there is no improvement of her sinonasal symptomatology.    The patient was instructed on the correct technique to administer the topical nasal spray (s) aiming at the lateral nasal wall for maximal efficacy and to avoid irritation to the septum which could lead to epistaxis. I stressed consistency of usage for maximal benefit. We discussed the rationale for the timing of this therapy and the benefits and potential adverse effects.      RTC 6w

## 2025-06-06 PROBLEM — R76.8 HEPATITIS C ANTIBODY POSITIVE IN BLOOD: Status: ACTIVE | Noted: 2023-11-16

## 2025-06-06 PROBLEM — R09.81 CONGESTION OF PARANASAL SINUS: Status: ACTIVE | Noted: 2025-06-06

## 2025-06-06 PROBLEM — R35.0 INCREASED FREQUENCY OF URINATION: Status: ACTIVE | Noted: 2022-12-29

## 2025-06-06 PROBLEM — O23.593 TRICHOMONAL VAGINITIS DURING PREGNANCY IN THIRD TRIMESTER: Status: ACTIVE | Noted: 2023-11-17

## 2025-06-06 PROBLEM — Z86.59 HISTORY OF DEPRESSION: Status: ACTIVE | Noted: 2025-06-06

## 2025-06-06 PROBLEM — A59.01 TRICHOMONAL VAGINITIS DURING PREGNANCY IN THIRD TRIMESTER: Status: ACTIVE | Noted: 2023-11-17

## 2025-06-06 PROBLEM — R87.612 LGSIL ON PAP SMEAR OF CERVIX: Status: ACTIVE | Noted: 2023-07-26

## 2025-06-06 PROBLEM — B37.31 CANDIDIASIS OF VAGINA: Status: ACTIVE | Noted: 2025-06-06

## 2025-06-06 PROBLEM — O09.299 H/O SHOULDER DYSTOCIA IN PRIOR PREGNANCY, CURRENTLY PREGNANT (HHS-HCC): Status: ACTIVE | Noted: 2023-11-17

## 2025-06-06 PROBLEM — R21 RASH: Status: ACTIVE | Noted: 2025-06-06

## 2025-06-06 PROBLEM — O99.820 GROUP B STREPTOCOCCUS CARRIER, ANTEPARTUM (HHS-HCC): Status: ACTIVE | Noted: 2024-01-08

## 2025-06-06 PROBLEM — F11.20 OPIOID DEPENDENCE: Status: ACTIVE | Noted: 2021-05-28

## 2025-06-06 PROBLEM — F19.90 IV DRUG USER: Status: ACTIVE | Noted: 2019-07-30

## 2025-06-06 PROBLEM — G47.00 INSOMNIA: Status: ACTIVE | Noted: 2025-06-06

## 2025-06-06 PROBLEM — J06.9 UPPER RESPIRATORY TRACT INFECTION: Status: ACTIVE | Noted: 2022-12-29

## 2025-06-06 PROBLEM — D64.9 ANEMIA: Status: ACTIVE | Noted: 2025-06-06

## 2025-06-06 PROBLEM — Z86.79 HISTORY OF HYPERTENSION: Status: ACTIVE | Noted: 2025-06-06

## 2025-06-06 PROBLEM — F19.90 SUBSTANCE USE: Status: ACTIVE | Noted: 2023-07-07

## 2025-06-06 PROBLEM — M79.89 SWELLING OF UPPER EXTREMITY: Status: ACTIVE | Noted: 2025-06-06

## 2025-06-06 PROBLEM — O09.91 SUPERVISION OF HIGH RISK PREGNANCY IN FIRST TRIMESTER (HHS-HCC): Status: ACTIVE | Noted: 2023-07-07

## 2025-06-06 PROBLEM — R73.9 HYPERGLYCEMIA: Status: ACTIVE | Noted: 2022-12-09

## 2025-06-06 PROBLEM — J45.901 EXACERBATION OF ASTHMA (HHS-HCC): Status: ACTIVE | Noted: 2025-06-06

## 2025-06-06 PROBLEM — J98.01 BRONCHOSPASM: Status: ACTIVE | Noted: 2025-06-06

## 2025-06-10 ENCOUNTER — APPOINTMENT (OUTPATIENT)
Dept: PRIMARY CARE | Facility: CLINIC | Age: 30
End: 2025-06-10
Payer: COMMERCIAL

## 2025-06-13 ENCOUNTER — APPOINTMENT (OUTPATIENT)
Dept: PRIMARY CARE | Facility: CLINIC | Age: 30
End: 2025-06-13
Payer: COMMERCIAL

## 2025-06-13 VITALS
OXYGEN SATURATION: 97 % | HEART RATE: 65 BPM | SYSTOLIC BLOOD PRESSURE: 143 MMHG | WEIGHT: 204 LBS | HEIGHT: 69 IN | BODY MASS INDEX: 30.21 KG/M2 | DIASTOLIC BLOOD PRESSURE: 83 MMHG

## 2025-06-13 DIAGNOSIS — Z86.79 HISTORY OF HYPERTENSION: ICD-10-CM

## 2025-06-13 DIAGNOSIS — I10 PRIMARY HYPERTENSION: ICD-10-CM

## 2025-06-13 DIAGNOSIS — R60.0 LEG EDEMA: Primary | ICD-10-CM

## 2025-06-13 PROCEDURE — 3077F SYST BP >= 140 MM HG: CPT

## 2025-06-13 PROCEDURE — 4004F PT TOBACCO SCREEN RCVD TLK: CPT

## 2025-06-13 PROCEDURE — 3079F DIAST BP 80-89 MM HG: CPT

## 2025-06-13 PROCEDURE — 99214 OFFICE O/P EST MOD 30 MIN: CPT

## 2025-06-13 PROCEDURE — 3008F BODY MASS INDEX DOCD: CPT

## 2025-06-13 RX ORDER — BUPRENORPHINE HYDROCHLORIDE 8 MG/1
TABLET SUBLINGUAL
COMMUNITY
Start: 2025-05-30

## 2025-06-13 ASSESSMENT — ENCOUNTER SYMPTOMS
PALPITATIONS: 0
FEVER: 0
OCCASIONAL FEELINGS OF UNSTEADINESS: 0
DEPRESSION: 0
DIZZINESS: 0
LIGHT-HEADEDNESS: 0
CHEST TIGHTNESS: 0
SHORTNESS OF BREATH: 0
CHILLS: 0
LOSS OF SENSATION IN FEET: 0

## 2025-06-13 ASSESSMENT — PATIENT HEALTH QUESTIONNAIRE - PHQ9
SUM OF ALL RESPONSES TO PHQ9 QUESTIONS 1 AND 2: 0
1. LITTLE INTEREST OR PLEASURE IN DOING THINGS: NOT AT ALL
2. FEELING DOWN, DEPRESSED OR HOPELESS: NOT AT ALL

## 2025-06-13 NOTE — PROGRESS NOTES
"Subjective   Patient ID: Emili Browne is a 30 y.o. female who presents for Acute Visit (Pt here for leg swelling, was doing squats and now has balls behind her knees).    HPI   31 yo female presents for acute visit. Complaining of swelling to lower extremities occurring on and off for quite some time. More swelling to left ankle region. She has had peripheral edema on and off for some years and has used torsemide as needed but now the left leg and ankle swelling tends to be more prominent. Reports history of left ankle abscess that caused bone infection and had I & D & surgery of the ankle somewhere in [place] in 2020. Since then the left ankle tends to stay swollen leaving indent mason from socks along with the leg and RLE swells up at times as well. Currently the swelling has stayed for a while unlike usual when it used to be intermittent. Denies pain or tenderness, no weakness, no problem ambulating, and no redness.  No recent injury, fall, or trauma of any kind.     Reports she used to be on a BP medication before some years ago but no longer since it was controlled. BP elevated today.     Review of Systems   Constitutional:  Negative for chills and fever.   Respiratory:  Negative for chest tightness and shortness of breath.    Cardiovascular:  Positive for leg swelling. Negative for chest pain and palpitations.   Musculoskeletal:  Negative for gait problem.   Neurological:  Negative for dizziness and light-headedness.       Objective   /83 (BP Location: Left arm, Patient Position: Sitting)   Pulse 65   Ht 1.753 m (5' 9\")   Wt 92.5 kg (204 lb)   SpO2 97%   BMI 30.13 kg/m²     Physical Exam  Constitutional:       Appearance: Normal appearance.   HENT:      Head: Normocephalic.   Cardiovascular:      Rate and Rhythm: Normal rate and regular rhythm.      Pulses: Normal pulses.           Dorsalis pedis pulses are 2+ on the right side and 2+ on the left side.   Pulmonary:      Effort: Pulmonary effort is " normal.      Breath sounds: Normal breath sounds.   Musculoskeletal:         General: No tenderness. Normal range of motion.      Right lower leg: Edema present.      Left lower leg: Edema present.      Comments: Nonpitting edema, more prominent to LLE (ankle region)   Feet:      Right foot:      Skin integrity: No skin breakdown, erythema or warmth.      Left foot:      Skin integrity: No skin breakdown, erythema or warmth.   Skin:     General: Skin is warm and dry.   Neurological:      Mental Status: She is alert and oriented to person, place, and time.   Psychiatric:         Mood and Affect: Mood normal.         Behavior: Behavior normal.         Assessment & Plan  Leg edema  -No redness or warmth to touch, non-pitting, no wound or drainage, no pain, no discoloration, more prominent edema to LLE than RLE especially ankle region  -Does not appear to be abscess, DVT related, or cellulitis   -More prominent localized left ankle edema may be due to chronic post-surgical edema from venous or lymphatic changes since patient reports history of left ankle surgery few years ago.  -Will get US of thel lower extremities to rule out venous insufficiency and other venous disorder that could cause edema and general labs   -elevate the extremities when sitting or lying, wear compression stocking  -Monitor salt intake, avoid foods high in sodium  Orders:    Vascular US lower extremity venous duplex bilateral; Future    B-type natriuretic peptide; Future    Comprehensive Metabolic Panel; Future    CBC and Auto Differential; Future    History of hypertension  -Patient with history of HTN not on medication currently, elevated BP today in office   -Needs to monitor at home and if readings > 130/80 then call PCP and can consider initiating BP medication again  -Monitor sodium intake, eat healthy, and work on weight management        Patient wanted to hold off on diuretic until the imaging and test results come back. Follow with PCP  as scheduled or with me sooner if needed.

## 2025-06-14 NOTE — ASSESSMENT & PLAN NOTE
Orders:    Follow Up In [facility] - PCP - Established    
-Patient with history of HTN not on medication currently, elevated BP today in office   -Needs to monitor at home and if readings > 130/80 then call PCP and can consider initiating BP medication again  -Monitor sodium intake, eat healthy, and work on weight management       
Transported in [place]

## 2025-07-25 ENCOUNTER — HOSPITAL ENCOUNTER (OUTPATIENT)
Dept: VASCULAR MEDICINE | Facility: HOSPITAL | Age: 30
Discharge: HOME | End: 2025-07-25
Payer: COMMERCIAL

## 2025-07-25 DIAGNOSIS — M79.89 OTHER SPECIFIED SOFT TISSUE DISORDERS: ICD-10-CM

## 2025-07-25 DIAGNOSIS — R60.0 LEG EDEMA: ICD-10-CM

## 2025-07-25 PROCEDURE — 93970 EXTREMITY STUDY: CPT

## 2025-07-25 PROCEDURE — 93970 EXTREMITY STUDY: CPT | Performed by: STUDENT IN AN ORGANIZED HEALTH CARE EDUCATION/TRAINING PROGRAM

## 2025-08-20 DIAGNOSIS — E55.9 VITAMIN D DEFICIENCY: ICD-10-CM

## 2025-08-20 DIAGNOSIS — B19.20 HEPATITIS C VIRUS INFECTION WITHOUT HEPATIC COMA, UNSPECIFIED CHRONICITY: ICD-10-CM

## 2025-08-20 DIAGNOSIS — E78.2 MIXED HYPERLIPIDEMIA: ICD-10-CM

## 2025-08-20 DIAGNOSIS — J45.20 MILD INTERMITTENT ASTHMA WITHOUT COMPLICATION (HHS-HCC): ICD-10-CM

## 2025-08-20 DIAGNOSIS — I10 PRIMARY HYPERTENSION: ICD-10-CM

## 2025-08-20 DIAGNOSIS — E53.8 VITAMIN B12 DEFICIENCY: ICD-10-CM

## 2025-08-20 DIAGNOSIS — R73.03 PREDIABETES: ICD-10-CM

## 2025-11-20 ENCOUNTER — APPOINTMENT (OUTPATIENT)
Dept: PRIMARY CARE | Facility: CLINIC | Age: 30
End: 2025-11-20
Payer: COMMERCIAL